# Patient Record
Sex: FEMALE | Race: WHITE | Employment: UNEMPLOYED | URBAN - METROPOLITAN AREA
[De-identification: names, ages, dates, MRNs, and addresses within clinical notes are randomized per-mention and may not be internally consistent; named-entity substitution may affect disease eponyms.]

---

## 2023-12-01 PROCEDURE — 99284 EMERGENCY DEPT VISIT MOD MDM: CPT

## 2023-12-01 RX ORDER — VALACYCLOVIR HYDROCHLORIDE 1 G/1
1000 TABLET, FILM COATED ORAL EVERY 12 HOURS SCHEDULED
COMMUNITY

## 2023-12-01 RX ORDER — FEXOFENADINE HCL 60 MG/1
60 TABLET, FILM COATED ORAL DAILY
COMMUNITY

## 2023-12-01 RX ORDER — OMEPRAZOLE 20 MG/1
20 CAPSULE, DELAYED RELEASE ORAL
COMMUNITY

## 2023-12-02 ENCOUNTER — HOSPITAL ENCOUNTER (EMERGENCY)
Age: 53
Discharge: HOME | End: 2023-12-02
Attending: EMERGENCY MEDICINE
Payer: COMMERCIAL

## 2023-12-02 VITALS
DIASTOLIC BLOOD PRESSURE: 64 MMHG | TEMPERATURE: 98 F | SYSTOLIC BLOOD PRESSURE: 100 MMHG | RESPIRATION RATE: 16 BRPM | OXYGEN SATURATION: 96 % | HEART RATE: 67 BPM | BODY MASS INDEX: 25.21 KG/M2 | HEIGHT: 62 IN | WEIGHT: 137 LBS

## 2023-12-02 DIAGNOSIS — R10.9 ABDOMINAL PAIN OF UNKNOWN ETIOLOGY: ICD-10-CM

## 2023-12-02 DIAGNOSIS — R11.2 NAUSEA AND VOMITING, UNSPECIFIED VOMITING TYPE: Primary | ICD-10-CM

## 2023-12-02 LAB
ALBUMIN SERPL-MCNC: 3.7 G/DL (ref 3.4–5)
ALBUMIN/GLOB SERPL: 0.9 {RATIO} (ref 1–2)
ALP LIVER SERPL-CCNC: 98 U/L
ALT SERPL-CCNC: 43 U/L
ANION GAP SERPL CALC-SCNC: 2 MMOL/L (ref 0–18)
AST SERPL-CCNC: 32 U/L (ref 15–37)
BASOPHILS # BLD AUTO: 0.03 X10(3) UL (ref 0–0.2)
BASOPHILS NFR BLD AUTO: 0.3 %
BILIRUB SERPL-MCNC: 0.2 MG/DL (ref 0.1–2)
BILIRUB UR QL STRIP.AUTO: NEGATIVE
BUN BLD-MCNC: 21 MG/DL (ref 9–23)
CALCIUM BLD-MCNC: 9.1 MG/DL (ref 8.5–10.1)
CHLORIDE SERPL-SCNC: 104 MMOL/L (ref 98–112)
CLARITY UR REFRACT.AUTO: CLEAR
CO2 SERPL-SCNC: 32 MMOL/L (ref 21–32)
COLOR UR AUTO: YELLOW
CREAT BLD-MCNC: 0.88 MG/DL
EGFRCR SERPLBLD CKD-EPI 2021: 79 ML/MIN/1.73M2 (ref 60–?)
EOSINOPHIL # BLD AUTO: 0.39 X10(3) UL (ref 0–0.7)
EOSINOPHIL NFR BLD AUTO: 3.6 %
ERYTHROCYTE [DISTWIDTH] IN BLOOD BY AUTOMATED COUNT: 12.7 %
GLOBULIN PLAS-MCNC: 4.3 G/DL (ref 2.8–4.4)
GLUCOSE BLD-MCNC: 115 MG/DL (ref 70–99)
GLUCOSE UR STRIP.AUTO-MCNC: NEGATIVE MG/DL
HCT VFR BLD AUTO: 39.1 %
HGB BLD-MCNC: 13.2 G/DL
HYALINE CASTS #/AREA URNS AUTO: PRESENT /LPF
HYALINE CASTS #/AREA URNS AUTO: PRESENT /LPF
IMM GRANULOCYTES # BLD AUTO: 0.03 X10(3) UL (ref 0–1)
IMM GRANULOCYTES NFR BLD: 0.3 %
KETONES UR STRIP.AUTO-MCNC: NEGATIVE MG/DL
LIPASE SERPL-CCNC: 50 U/L (ref 13–75)
LYMPHOCYTES # BLD AUTO: 1.71 X10(3) UL (ref 1–4)
LYMPHOCYTES NFR BLD AUTO: 15.6 %
MCH RBC QN AUTO: 31.5 PG (ref 26–34)
MCHC RBC AUTO-ENTMCNC: 33.8 G/DL (ref 31–37)
MCV RBC AUTO: 93.3 FL
MONOCYTES # BLD AUTO: 0.63 X10(3) UL (ref 0.1–1)
MONOCYTES NFR BLD AUTO: 5.8 %
NEUTROPHILS # BLD AUTO: 8.15 X10 (3) UL (ref 1.5–7.7)
NEUTROPHILS # BLD AUTO: 8.15 X10(3) UL (ref 1.5–7.7)
NEUTROPHILS NFR BLD AUTO: 74.4 %
NITRITE UR QL STRIP.AUTO: NEGATIVE
OSMOLALITY SERPL CALC.SUM OF ELEC: 290 MOSM/KG (ref 275–295)
PH UR STRIP.AUTO: 7 [PH] (ref 5–8)
PLATELET # BLD AUTO: 339 10(3)UL (ref 150–450)
POTASSIUM SERPL-SCNC: 3.4 MMOL/L (ref 3.5–5.1)
PROT SERPL-MCNC: 8 G/DL (ref 6.4–8.2)
PROT UR STRIP.AUTO-MCNC: NEGATIVE MG/DL
RBC # BLD AUTO: 4.19 X10(6)UL
RBC UR QL AUTO: NEGATIVE
SODIUM SERPL-SCNC: 138 MMOL/L (ref 136–145)
SP GR UR STRIP.AUTO: 1.02 (ref 1–1.03)
UROBILINOGEN UR STRIP.AUTO-MCNC: 0.2 MG/DL
WBC # BLD AUTO: 10.9 X10(3) UL (ref 4–11)

## 2023-12-02 PROCEDURE — 81001 URINALYSIS AUTO W/SCOPE: CPT | Performed by: EMERGENCY MEDICINE

## 2023-12-02 PROCEDURE — 80053 COMPREHEN METABOLIC PANEL: CPT | Performed by: EMERGENCY MEDICINE

## 2023-12-02 PROCEDURE — 81015 MICROSCOPIC EXAM OF URINE: CPT

## 2023-12-02 PROCEDURE — 85025 COMPLETE CBC W/AUTO DIFF WBC: CPT | Performed by: EMERGENCY MEDICINE

## 2023-12-02 PROCEDURE — 96375 TX/PRO/DX INJ NEW DRUG ADDON: CPT

## 2023-12-02 PROCEDURE — 96361 HYDRATE IV INFUSION ADD-ON: CPT

## 2023-12-02 PROCEDURE — 96374 THER/PROPH/DIAG INJ IV PUSH: CPT

## 2023-12-02 PROCEDURE — 83690 ASSAY OF LIPASE: CPT | Performed by: EMERGENCY MEDICINE

## 2023-12-02 PROCEDURE — 80053 COMPREHEN METABOLIC PANEL: CPT

## 2023-12-02 PROCEDURE — 83690 ASSAY OF LIPASE: CPT

## 2023-12-02 PROCEDURE — 85025 COMPLETE CBC W/AUTO DIFF WBC: CPT

## 2023-12-02 PROCEDURE — 81001 URINALYSIS AUTO W/SCOPE: CPT

## 2023-12-02 RX ORDER — ONDANSETRON 4 MG/1
4 TABLET, ORALLY DISINTEGRATING ORAL EVERY 8 HOURS PRN
Qty: 10 TABLET | Refills: 0 | Status: SHIPPED | OUTPATIENT
Start: 2023-12-02 | End: 2023-12-09

## 2023-12-02 RX ORDER — KETOROLAC TROMETHAMINE 15 MG/ML
15 INJECTION, SOLUTION INTRAMUSCULAR; INTRAVENOUS ONCE
Status: COMPLETED | OUTPATIENT
Start: 2023-12-02 | End: 2023-12-02

## 2023-12-02 RX ORDER — ONDANSETRON 2 MG/ML
4 INJECTION INTRAMUSCULAR; INTRAVENOUS ONCE
Status: COMPLETED | OUTPATIENT
Start: 2023-12-02 | End: 2023-12-02

## 2023-12-02 RX ORDER — POTASSIUM CHLORIDE 20 MEQ/1
20 TABLET, EXTENDED RELEASE ORAL ONCE
Status: COMPLETED | OUTPATIENT
Start: 2023-12-02 | End: 2023-12-02

## 2023-12-02 NOTE — ED INITIAL ASSESSMENT (HPI)
Patient here with abdominal pain. States there have been episodes over the past week. Also notes N/V/D.

## 2024-04-03 ENCOUNTER — APPOINTMENT (OUTPATIENT)
Dept: GENERAL RADIOLOGY | Age: 54
End: 2024-04-03
Attending: EMERGENCY MEDICINE
Payer: COMMERCIAL

## 2024-04-03 ENCOUNTER — APPOINTMENT (OUTPATIENT)
Dept: CT IMAGING | Age: 54
End: 2024-04-03
Attending: EMERGENCY MEDICINE
Payer: COMMERCIAL

## 2024-04-03 ENCOUNTER — HOSPITAL ENCOUNTER (EMERGENCY)
Age: 54
Discharge: HOME | End: 2024-04-03
Attending: EMERGENCY MEDICINE
Payer: COMMERCIAL

## 2024-04-03 VITALS
TEMPERATURE: 99 F | BODY MASS INDEX: 25.76 KG/M2 | RESPIRATION RATE: 16 BRPM | DIASTOLIC BLOOD PRESSURE: 65 MMHG | WEIGHT: 140 LBS | HEART RATE: 72 BPM | SYSTOLIC BLOOD PRESSURE: 133 MMHG | HEIGHT: 62 IN | OXYGEN SATURATION: 97 %

## 2024-04-03 DIAGNOSIS — K21.9 GASTROESOPHAGEAL REFLUX DISEASE, UNSPECIFIED WHETHER ESOPHAGITIS PRESENT: ICD-10-CM

## 2024-04-03 DIAGNOSIS — R10.9 ABDOMINAL PAIN, ACUTE: Primary | ICD-10-CM

## 2024-04-03 LAB
ALBUMIN SERPL-MCNC: 3.9 G/DL (ref 3.4–5)
ALBUMIN/GLOB SERPL: 1.1 {RATIO} (ref 1–2)
ALP LIVER SERPL-CCNC: 76 U/L
ALT SERPL-CCNC: 72 U/L
ANION GAP SERPL CALC-SCNC: 6 MMOL/L (ref 0–18)
AST SERPL-CCNC: 67 U/L (ref 15–37)
B-HCG UR QL: NEGATIVE
BASOPHILS # BLD AUTO: 0.01 X10(3) UL (ref 0–0.2)
BASOPHILS NFR BLD AUTO: 0.2 %
BILIRUB SERPL-MCNC: 0.4 MG/DL (ref 0.1–2)
BILIRUB UR QL STRIP.AUTO: NEGATIVE
BUN BLD-MCNC: 22 MG/DL (ref 9–23)
CALCIUM BLD-MCNC: 9.4 MG/DL (ref 8.5–10.1)
CHLORIDE SERPL-SCNC: 104 MMOL/L (ref 98–112)
CLARITY UR REFRACT.AUTO: CLEAR
CO2 SERPL-SCNC: 27 MMOL/L (ref 21–32)
COLOR UR AUTO: YELLOW
CREAT BLD-MCNC: 0.73 MG/DL
D DIMER PPP FEU-MCNC: 0.48 UG/ML FEU (ref ?–0.53)
EGFRCR SERPLBLD CKD-EPI 2021: 98 ML/MIN/1.73M2 (ref 60–?)
EOSINOPHIL # BLD AUTO: 0.1 X10(3) UL (ref 0–0.7)
EOSINOPHIL NFR BLD AUTO: 1.6 %
ERYTHROCYTE [DISTWIDTH] IN BLOOD BY AUTOMATED COUNT: 12.7 %
GLOBULIN PLAS-MCNC: 3.7 G/DL (ref 2.8–4.4)
GLUCOSE BLD-MCNC: 103 MG/DL (ref 70–99)
GLUCOSE UR STRIP.AUTO-MCNC: NEGATIVE MG/DL
HCT VFR BLD AUTO: 37.9 %
HGB BLD-MCNC: 13.2 G/DL
IMM GRANULOCYTES # BLD AUTO: 0.01 X10(3) UL (ref 0–1)
IMM GRANULOCYTES NFR BLD: 0.2 %
KETONES UR STRIP.AUTO-MCNC: 15 MG/DL
LEUKOCYTE ESTERASE UR QL STRIP.AUTO: NEGATIVE
LIPASE SERPL-CCNC: 43 U/L (ref 13–75)
LYMPHOCYTES # BLD AUTO: 1.69 X10(3) UL (ref 1–4)
LYMPHOCYTES NFR BLD AUTO: 27.7 %
MCH RBC QN AUTO: 32.2 PG (ref 26–34)
MCHC RBC AUTO-ENTMCNC: 34.8 G/DL (ref 31–37)
MCV RBC AUTO: 92.4 FL
MONOCYTES # BLD AUTO: 0.38 X10(3) UL (ref 0.1–1)
MONOCYTES NFR BLD AUTO: 6.2 %
NEUTROPHILS # BLD AUTO: 3.92 X10 (3) UL (ref 1.5–7.7)
NEUTROPHILS # BLD AUTO: 3.92 X10(3) UL (ref 1.5–7.7)
NEUTROPHILS NFR BLD AUTO: 64.1 %
NITRITE UR QL STRIP.AUTO: NEGATIVE
OSMOLALITY SERPL CALC.SUM OF ELEC: 288 MOSM/KG (ref 275–295)
PH UR STRIP.AUTO: 7 [PH] (ref 5–8)
PLATELET # BLD AUTO: 282 10(3)UL (ref 150–450)
POTASSIUM SERPL-SCNC: 3.5 MMOL/L (ref 3.5–5.1)
PROT SERPL-MCNC: 7.6 G/DL (ref 6.4–8.2)
PROT UR STRIP.AUTO-MCNC: NEGATIVE MG/DL
RBC # BLD AUTO: 4.1 X10(6)UL
RBC UR QL AUTO: NEGATIVE
SODIUM SERPL-SCNC: 137 MMOL/L (ref 136–145)
SP GR UR STRIP.AUTO: 1.02 (ref 1–1.03)
TROPONIN I SERPL HS-MCNC: 3 NG/L
UROBILINOGEN UR STRIP.AUTO-MCNC: 0.2 MG/DL
WBC # BLD AUTO: 6.1 X10(3) UL (ref 4–11)

## 2024-04-03 PROCEDURE — 85379 FIBRIN DEGRADATION QUANT: CPT | Performed by: EMERGENCY MEDICINE

## 2024-04-03 PROCEDURE — 99285 EMERGENCY DEPT VISIT HI MDM: CPT

## 2024-04-03 PROCEDURE — 80053 COMPREHEN METABOLIC PANEL: CPT | Performed by: EMERGENCY MEDICINE

## 2024-04-03 PROCEDURE — 93005 ELECTROCARDIOGRAM TRACING: CPT

## 2024-04-03 PROCEDURE — 84484 ASSAY OF TROPONIN QUANT: CPT | Performed by: EMERGENCY MEDICINE

## 2024-04-03 PROCEDURE — 74177 CT ABD & PELVIS W/CONTRAST: CPT | Performed by: EMERGENCY MEDICINE

## 2024-04-03 PROCEDURE — 85025 COMPLETE CBC W/AUTO DIFF WBC: CPT | Performed by: EMERGENCY MEDICINE

## 2024-04-03 PROCEDURE — 83690 ASSAY OF LIPASE: CPT | Performed by: EMERGENCY MEDICINE

## 2024-04-03 PROCEDURE — 96375 TX/PRO/DX INJ NEW DRUG ADDON: CPT

## 2024-04-03 PROCEDURE — 96361 HYDRATE IV INFUSION ADD-ON: CPT

## 2024-04-03 PROCEDURE — 81003 URINALYSIS AUTO W/O SCOPE: CPT | Performed by: EMERGENCY MEDICINE

## 2024-04-03 PROCEDURE — 81025 URINE PREGNANCY TEST: CPT

## 2024-04-03 PROCEDURE — 93010 ELECTROCARDIOGRAM REPORT: CPT

## 2024-04-03 PROCEDURE — 71045 X-RAY EXAM CHEST 1 VIEW: CPT | Performed by: EMERGENCY MEDICINE

## 2024-04-03 PROCEDURE — 96374 THER/PROPH/DIAG INJ IV PUSH: CPT

## 2024-04-03 RX ORDER — KETOROLAC TROMETHAMINE 30 MG/ML
30 INJECTION, SOLUTION INTRAMUSCULAR; INTRAVENOUS ONCE
Status: COMPLETED | OUTPATIENT
Start: 2024-04-03 | End: 2024-04-03

## 2024-04-03 RX ORDER — ONDANSETRON 2 MG/ML
4 INJECTION INTRAMUSCULAR; INTRAVENOUS ONCE
Status: COMPLETED | OUTPATIENT
Start: 2024-04-03 | End: 2024-04-03

## 2024-04-03 RX ORDER — DICYCLOMINE HCL 20 MG
20 TABLET ORAL 4 TIMES DAILY PRN
Qty: 15 TABLET | Refills: 0 | Status: SHIPPED | OUTPATIENT
Start: 2024-04-03

## 2024-04-03 RX ORDER — SUCRALFATE 1 G/1
1 TABLET ORAL
Qty: 30 TABLET | Refills: 0 | Status: SHIPPED | OUTPATIENT
Start: 2024-04-03

## 2024-04-03 RX ORDER — SODIUM CHLORIDE 9 MG/ML
INJECTION, SOLUTION INTRAVENOUS ONCE
Status: COMPLETED | OUTPATIENT
Start: 2024-04-03 | End: 2024-04-03

## 2024-04-03 RX ORDER — PANTOPRAZOLE SODIUM 40 MG/1
40 TABLET, DELAYED RELEASE ORAL DAILY
Qty: 30 TABLET | Refills: 0 | Status: SHIPPED | OUTPATIENT
Start: 2024-04-03 | End: 2024-05-03

## 2024-04-03 NOTE — ED INITIAL ASSESSMENT (HPI)
Pt reports sudden onset mid abd pain radiating to chest and mid upper back around 1400 today while laying in bed. Pt states she felt SOB and nausea/heaving and one episode of diarrhea. Pt states pain has improved upon arrival.

## 2024-04-03 NOTE — ED PROVIDER NOTES
Patient Seen in: [facility] In [place]      History     Chief Complaint   Patient presents with    Abdomen/Flank Pain    Chest Pain     Stated Complaint: lower abdominal cramping, bloating and nausea    Subjective:   HPI    Patient is a 53-year-old female presents emergency room with history of sudden onset of mid abdominal pain that radiate up towards her epigastric area in her mid back which started around 2:00 this afternoon when she was laying in bed.  The patient that she felt some nausea associated with symptoms.  The patient had 1 episode of diarrhea.  The patient states that her pain is since improved.  Patient states she had some similar pain last December was seen here in the emergency room had a negative workup done at that time.  The patient had a previous cholecystectomy no other abdominal surgeries.  The patient denies history of any heart or lung problems in the past.  The patient denies history of any other somatic complaints or discomfort at this time.    Objective:   No pertinent past medical history.            No pertinent past surgical history.              No pertinent social history.            Review of Systems    Positive for stated complaint: lower abdominal cramping, bloating and nausea  Other systems are as noted in HPI.  Constitutional and vital signs reviewed.      All other systems reviewed and negative except as noted above.    Physical Exam     ED Triage Vitals [04/03/24 1732]   /85   Pulse 69   Resp 16   Temp 99.1 °F (37.3 °C)   Temp src Temporal   SpO2 96 %   O2 Device None (Room air)       Current:/65   Pulse 72   Temp 99.1 °F (37.3 °C) (Temporal)   Resp 16   Ht 157.5 cm (5' 2\")   Wt 63.5 kg   SpO2 97%   BMI 25.61 kg/m²         Physical Exam  GENERAL: Well-developed, well-nourished female sitting up breathing easily in no apparent distress.  Patient is nontoxic in appearance.  HEENT: Head is normocephalic, atraumatic. Pupils are 4 mm  equally round and reactive to light. Oropharynx is clear. Mucous membranes are moist.  NECK: There is no focal tenderness to palpation appreciated.   LUNGS: Clear to auscultation bilaterally with no wheeze. There is good equal air entry bilaterally.  HEART: Regular rate and rhythm. Normal S1, S2 no S3, or S4. No murmur.  ABDOMEN: There is mild focal tenderness to palpation appreciated to the epigastric and mid abdomen only with no other focal tenderness to palpation appreciated. There is no guarding, no rebound, no mass, and no organomegaly appreciated. There is normoactive bowel sounds.   EXTREMITIES: There is no cyanosis, clubbing, or edema appreciated. Pulses are 2+ and equal in all 4 extremities.  NEURO: Patient is awake, alert and oriented to time place and person. Motor strength is 5 over 5 in all 4 extremities. There are no gross motor or sensory deficits appreciated.  Patient answering all questions appropriately.           ED Course     Labs Reviewed   COMP METABOLIC PANEL (14) - Abnormal; Notable for the following components:       Result Value    Glucose 103 (*)     AST 67 (*)     ALT 72 (*)     All other components within normal limits   URINALYSIS WITH CULTURE REFLEX - Abnormal; Notable for the following components:    Ketones Urine 15 (*)     All other components within normal limits   TROPONIN I HIGH SENSITIVITY - Normal   LIPASE - Normal   D-DIMER - Normal   POCT PREGNANCY URINE - Normal   CBC WITH DIFFERENTIAL WITH PLATELET    Narrative:     The following orders were created for panel order CBC With Differential With Platelet.  Procedure                               Abnormality         Status                     ---------                               -----------         ------                     CBC W/ DIFFERENTIAL[[id number]]                              Final result                 Please view results for these tests on the individual orders.   RAINBOW DRAW LAVENDER   RAINBOW DRAW LIGHT GREEN    RAINBOW DRAW BLUE   CBC W/ DIFFERENTIAL     EKG    Rate, intervals and axes as noted on EKG Report.  Rate: 63  Rhythm: Sinus Rhythm  Reading: No acute ischemic change noted         I personally reviewed the patient's chest x-ray images and my individual interpretation of these images shows no evidence of any acute pneumothorax or free air.  I also reviewed the official radiology report which showed results as noted below.        CT ABDOMEN+PELVIS(CONTRAST ONLY)(CPT=74177)    Result Date: 4/3/2024  CONCLUSION:  1. No acute abnormality in the abdomen and pelvis.  Incidental left adnexal cyst is likely physiologic 2. Myomatous uterus.   LOCATION:  [id number]   Dictated by (CST): eMlvin Jack MD on 4/03/2024 at 8:26 PM     Finalized by (CST): Melvin Jack MD on 4/03/2024 at 8:29 PM       XR CHEST AP PORTABLE  (CPT=71045)    Result Date: 4/3/2024  CONCLUSION: No acute cardiopulmonary abnormality.   LOCATION:  [id number]      Dictated by (CST): Melvin Jack MD on 4/03/2024 at 6:40 PM     Finalized by (CST): Melvin Jack MD on 4/03/2024 at 6:41 PM               MDM          19:49 patient sitting back and breathing easily in no apparent distress this time.  Patient feeling better on repeat examination at this time.  Patient does states she has some mild lower abdominal cramping only at this time.  Patient does not want any additional medication for pain at this time.  Awaiting CT scan at this time.  21:00 patient sitting back and breathing easily in no apparent distress this time.  The patient be discharged home at this time.  Patient instructed to follow-up with primary care and encourage a bland diet at home.  Patient discharged home with no further complaints      Patient had an IV line established blood work drawn including a CBC, chemistries, BUN and creatinine, and blood sugar all of which are unremarkable.  She has mild elevation of her liver transaminases which the patient is made aware of and the need for follow-up  with her primary care doctor for these.  Patient's bilirubin, alkaline phosphatase, and lipase are all negative.  Patient troponin is negative.  Patient D-dimer is negative.  Patient was given IV fluids and IV pain medication the emergency room felt better after this was given.  The patient had no further new complaints throughout the rest of the emergency room stay.  The patient will be discharged home at this time patient was feeling better on repeat examination.  She knows to follow-up with her primary care doctor regarding her symptoms.  Patient knows to return to the ER immediately if symptoms worsen or if any other problems arise.  Patient discharged home with improvement in symptoms and no further complaints.                             Medical Decision Making      Disposition and Plan     Clinical Impression:  1. Abdominal pain, acute    2. Gastroesophageal reflux disease, unspecified whether esophagitis present         Disposition:  Discharge  4/3/2024  9:00 pm    Follow-up:  Garrison Charles MD  [id number]  [facility] [address]  [phone]    Follow up in 2 day(s)            Medications Prescribed:  Discharge Medication List as of 4/3/2024  9:05 PM        START taking these medications    Details   dicyclomine 20 MG Oral Tab Take 1 tablet (20 mg total) by mouth 4 (four) times daily as needed., Normal, Disp-15 tablet, R-0      pantoprazole 40 MG Oral Tab EC Take 1 tablet (40 mg total) by mouth daily., Normal, Disp-30 tablet, R-0      sucralfate 1 g Oral Tab Take 1 tablet (1 g total) by mouth 4 (four) times daily before meals and nightly., Normal, Disp-30 tablet, R-0

## 2024-04-04 LAB
ATRIAL RATE: 63 BPM
P AXIS: 34 DEGREES
P-R INTERVAL: 156 MS
Q-T INTERVAL: 394 MS
QRS DURATION: 82 MS
QTC CALCULATION (BEZET): 403 MS
R AXIS: 29 DEGREES
T AXIS: 54 DEGREES
VENTRICULAR RATE: 63 BPM

## 2024-04-04 NOTE — DISCHARGE INSTRUCTIONS
ENCOURAGE A BLAND DIET AT HOME  NO CAFFEINE, CHOCOLATE, SPICY FOODS, OR ALCOHOL AT HOME  RETURN TO THE ED IF SYMPTOMS WORSEN OR IF ANY OTHER PROBLEMS ARISE

## 2024-07-26 ENCOUNTER — APPOINTMENT (OUTPATIENT)
Dept: CT IMAGING | Age: 54
End: 2024-07-26
Attending: EMERGENCY MEDICINE
Payer: COMMERCIAL

## 2024-07-26 ENCOUNTER — HOSPITAL ENCOUNTER (EMERGENCY)
Age: 54
Discharge: HOME | End: 2024-07-26
Attending: EMERGENCY MEDICINE
Payer: COMMERCIAL

## 2024-07-26 VITALS
SYSTOLIC BLOOD PRESSURE: 130 MMHG | WEIGHT: 140 LBS | BODY MASS INDEX: 26 KG/M2 | DIASTOLIC BLOOD PRESSURE: 68 MMHG | TEMPERATURE: 98 F | RESPIRATION RATE: 16 BRPM | OXYGEN SATURATION: 99 % | HEART RATE: 60 BPM

## 2024-07-26 DIAGNOSIS — N23 RENAL COLIC: Primary | ICD-10-CM

## 2024-07-26 LAB
BILIRUB UR QL STRIP.AUTO: NEGATIVE
COLOR UR AUTO: YELLOW
GLUCOSE UR STRIP.AUTO-MCNC: NEGATIVE MG/DL
KETONES UR STRIP.AUTO-MCNC: NEGATIVE MG/DL
NITRITE UR QL STRIP.AUTO: NEGATIVE
PH UR STRIP.AUTO: 5.5 [PH] (ref 5–8)
RBC #/AREA URNS AUTO: >10 /HPF
SP GR UR STRIP.AUTO: >=1.03 (ref 1–1.03)
UROBILINOGEN UR STRIP.AUTO-MCNC: 0.2 MG/DL

## 2024-07-26 PROCEDURE — 96361 HYDRATE IV INFUSION ADD-ON: CPT

## 2024-07-26 PROCEDURE — 87186 SC STD MICRODIL/AGAR DIL: CPT | Performed by: EMERGENCY MEDICINE

## 2024-07-26 PROCEDURE — 96375 TX/PRO/DX INJ NEW DRUG ADDON: CPT

## 2024-07-26 PROCEDURE — 81001 URINALYSIS AUTO W/SCOPE: CPT | Performed by: EMERGENCY MEDICINE

## 2024-07-26 PROCEDURE — 96374 THER/PROPH/DIAG INJ IV PUSH: CPT

## 2024-07-26 PROCEDURE — 87086 URINE CULTURE/COLONY COUNT: CPT | Performed by: EMERGENCY MEDICINE

## 2024-07-26 PROCEDURE — 99284 EMERGENCY DEPT VISIT MOD MDM: CPT

## 2024-07-26 PROCEDURE — 74176 CT ABD & PELVIS W/O CONTRAST: CPT | Performed by: EMERGENCY MEDICINE

## 2024-07-26 PROCEDURE — 81015 MICROSCOPIC EXAM OF URINE: CPT | Performed by: EMERGENCY MEDICINE

## 2024-07-26 PROCEDURE — 87088 URINE BACTERIA CULTURE: CPT | Performed by: EMERGENCY MEDICINE

## 2024-07-26 RX ORDER — ONDANSETRON 2 MG/ML
4 INJECTION INTRAMUSCULAR; INTRAVENOUS ONCE
Status: COMPLETED | OUTPATIENT
Start: 2024-07-26 | End: 2024-07-26

## 2024-07-26 RX ORDER — KETOROLAC TROMETHAMINE 30 MG/ML
30 INJECTION, SOLUTION INTRAMUSCULAR; INTRAVENOUS ONCE
Status: COMPLETED | OUTPATIENT
Start: 2024-07-26 | End: 2024-07-26

## 2024-07-26 RX ORDER — ONDANSETRON 4 MG/1
4 TABLET, ORALLY DISINTEGRATING ORAL EVERY 4 HOURS PRN
Qty: 10 TABLET | Refills: 0 | Status: SHIPPED | OUTPATIENT
Start: 2024-07-26 | End: 2024-07-31

## 2024-07-26 NOTE — ED PROVIDER NOTES
Patient Seen in: [facility] In [place]      History     Chief Complaint   Patient presents with    Abdomen/Flank Pain     Stated Complaint: right flank pain    Subjective:   HPI    54-year-old female with a previous history of kidney stones, presents to the emergency department with complaints of acute onset right flank pain and nausea.  Patient states that she was asleep when she had sudden onset pain which woke her up.  She states it feels similar to her prior kidney stones.  Denies fevers or chills.  She had complaints of nausea without vomiting.  Denies any dysuria hematuria or frequency.  She states that in the past she has had lithotripsy.    Objective:   No pertinent past medical history.            No pertinent past surgical history.              No pertinent social history.            Review of Systems    Positive for stated Chief Complaint: Abdomen/Flank Pain    Other systems are as noted in HPI.  Constitutional and vital signs reviewed.      All other systems reviewed and negative except as noted above.    Physical Exam     ED Triage Vitals   BP    Pulse    Resp    Temp    Temp src    SpO2    O2 Device        Current Vitals:   No data recorded        Physical Exam    General: Alert and oriented. No acute distress.  HEENT: Normocephalic. No evidence of trauma. Extraocular movements are intact.  Cardiovascular exam: Regular rate and rhythm  Lungs: Clear to auscultation bilaterally.  Abdomen: Soft, nondistended, nontender.  Extremities: No evidence of deformity. No clubbing or cyanosis.  Neuro: No focal deficit is noted.    ED Course     Labs Reviewed   URINALYSIS WITH CULTURE REFLEX   Patient was brought back to the examination room immediately.  The patient was then placed on a cardiac monitor and pulse oximetry was applied.  Patient had an IV established and labs were drawn.    The patient was administered Toradol 30 mg IV, Zofran 4 mg IV medications for the purposes of treating   [acute renal colic].  The patient had good response to these medications.    Patient continued to be observed here in the emergency department.  Patient remained stable throughout the emergency department observation period.    Findings  of the patient's tests results were discussed with the patient in detail.  They were understanding of these results and their discharge instructions.  All questions were answered.         MDM        History is provided by the patient  Past medical history includes a history of kidney stones, gastroesophageal reflux  Surgical history includes a cholecystectomy and breast surgery  Social history negative for smoking or alcohol abuse  Family history is noncontributory    Differential diagnosis includes acute renal colic versus pyelonephritis versus musculoskeletal back pain    Patient had a peripheral IV established.  Patient was administered IV Toradol and Zofran.  A CT scan of the abdomen pelvis has been ordered.        Urinalysis shows large blood.  Trace leukocyte Estrace.    Patient was reassessed at 5:50 AM.  She states that her pain was resolved after Toradol.  We discussed potential pain medications that she can go home with that she states that she is allergic to fentanyl and codeine both of which are causing her nausea vomiting.  She declines any pain medications at this time.    Patient was screened and evaluated during this visit.   As a treating physician attending to the patient, I determined, within reasonable clinical confidence and prior to discharge, that an emergency medical condition was not or was no longer present.  There was no indication for further evaluation, treatment or admission on an emergency basis.  Comprehensive verbal and written discharge and follow-up instructions were provided to help prevent relapse or worsening.  Patient was instructed to follow-up with her primary care provider for further evaluation and treatment, but to return immediately to  the ER for worsening, concerning, new, changing or persisting symptoms.  I discussed the case with the patient and they had no questions, complaints, or concerns.  Patient felt comfortable going home.    ^^Please note that this report has been produced using speech recognition software and may contain errors related to that system including, but not limited to, errors in grammar, punctuation, and spelling, as well as words and phrases that possibly may have been recognized inappropriately.  If there are any questions or concerns, contact the dictating provider for clarification                                 Medical Decision Making      Disposition and Plan     Clinical Impression:  No diagnosis found.     Disposition:  There is no disposition on file for this visit.  There is no disposition time on file for this visit.    Follow-up:  No follow-up provider specified.        Medications Prescribed:  Current Discharge Medication List                                          inappropriately.  If there are any questions or concerns, contact the dictating provider for clarification                                 Medical Decision Making      Disposition and Plan     Clinical Impression:  1. Renal colic         Disposition:  Discharge  7/26/2024  6:18 am    Follow-up:  Grarison Charles MD  [id number]  [facility] [address]  [phone]    Follow up      [facility]   [address] [facility] [id number] [phone]  Follow up            Medications Prescribed:  Discharge Medication List as of 7/26/2024  6:19 AM        START taking these medications    Details   ondansetron 4 MG Oral Tablet Dispersible Take 1 tablet (4 mg total) by mouth every 4 (four) hours as needed for Nausea., Normal, Disp-10 tablet, R-0

## 2024-07-26 NOTE — DISCHARGE INSTRUCTIONS
Follow-up with urology.  Take ibuprofen as needed for pain every 6 hours  Take Zofran as needed for nausea every 4 hours  Strain all urine until stone is passed  Return to the emergency department for any worsening symptoms or new concern

## 2024-07-28 ENCOUNTER — ANESTHESIA EVENT (OUTPATIENT)
Dept: SURGERY | Facility: HOSPITAL | Age: 54
End: 2024-07-28
Payer: COMMERCIAL

## 2024-07-28 ENCOUNTER — HOSPITAL ENCOUNTER (INPATIENT)
Facility: HOSPITAL | Age: 54
LOS: 3 days | Discharge: HOME | End: 2024-07-31
Attending: EMERGENCY MEDICINE | Admitting: HOSPITALIST
Payer: COMMERCIAL

## 2024-07-28 ENCOUNTER — APPOINTMENT (OUTPATIENT)
Dept: GENERAL RADIOLOGY | Facility: HOSPITAL | Age: 54
End: 2024-07-28
Attending: UROLOGY
Payer: COMMERCIAL

## 2024-07-28 ENCOUNTER — HOSPITAL ENCOUNTER (EMERGENCY)
Age: 54
Discharge: STILL ADMITTED OR ADMINISTRATIVE | End: 2024-07-28
Payer: COMMERCIAL

## 2024-07-28 ENCOUNTER — ANESTHESIA (OUTPATIENT)
Dept: SURGERY | Facility: HOSPITAL | Age: 54
End: 2024-07-28
Payer: COMMERCIAL

## 2024-07-28 DIAGNOSIS — N10 ACUTE PYELONEPHRITIS: ICD-10-CM

## 2024-07-28 DIAGNOSIS — N20.1 URETEROLITHIASIS: Primary | ICD-10-CM

## 2024-07-28 LAB
ALBUMIN SERPL-MCNC: 4.7 G/DL (ref 3.2–4.8)
ALBUMIN/GLOB SERPL: 1.7 {RATIO} (ref 1–2)
ALP LIVER SERPL-CCNC: 116 U/L
ALT SERPL-CCNC: 117 U/L
ANION GAP SERPL CALC-SCNC: 6 MMOL/L (ref 0–18)
APTT PPP: 26.5 SECONDS (ref 23–36)
AST SERPL-CCNC: 75 U/L (ref ?–34)
ATRIAL RATE: 103 BPM
BASOPHILS # BLD AUTO: 0.02 X10(3) UL (ref 0–0.2)
BASOPHILS NFR BLD AUTO: 0.2 %
BILIRUB SERPL-MCNC: 0.5 MG/DL (ref 0.3–1.2)
BILIRUB UR QL STRIP.AUTO: NEGATIVE
BUN BLD-MCNC: 15 MG/DL (ref 9–23)
CALCIUM BLD-MCNC: 9.3 MG/DL (ref 8.7–10.4)
CHLORIDE SERPL-SCNC: 101 MMOL/L (ref 98–112)
CO2 SERPL-SCNC: 28 MMOL/L (ref 21–32)
COLOR UR AUTO: YELLOW
CREAT BLD-MCNC: 0.83 MG/DL
EGFRCR SERPLBLD CKD-EPI 2021: 84 ML/MIN/1.73M2 (ref 60–?)
EOSINOPHIL # BLD AUTO: 0 X10(3) UL (ref 0–0.7)
EOSINOPHIL NFR BLD AUTO: 0 %
ERYTHROCYTE [DISTWIDTH] IN BLOOD BY AUTOMATED COUNT: 13.1 %
ERYTHROCYTE [DISTWIDTH] IN BLOOD BY AUTOMATED COUNT: 13.1 %
GLOBULIN PLAS-MCNC: 2.7 G/DL (ref 2–3.5)
GLUCOSE BLD-MCNC: 113 MG/DL (ref 70–99)
GLUCOSE UR STRIP.AUTO-MCNC: NORMAL MG/DL
HCG UR QL: NEGATIVE
HCT VFR BLD AUTO: 35.7 %
HCT VFR BLD AUTO: 35.7 %
HGB BLD-MCNC: 12.2 G/DL
HGB BLD-MCNC: 12.2 G/DL
IMM GRANULOCYTES # BLD AUTO: 0.04 X10(3) UL (ref 0–1)
IMM GRANULOCYTES NFR BLD: 0.3 %
INR BLD: 1.09 (ref 0.8–1.2)
LACTATE SERPL-SCNC: 0.9 MMOL/L (ref 0.5–2)
LACTATE SERPL-SCNC: 0.9 MMOL/L (ref 0.5–2)
LEUKOCYTE ESTERASE UR QL STRIP.AUTO: 500
LYMPHOCYTES # BLD AUTO: 0.41 X10(3) UL (ref 1–4)
LYMPHOCYTES NFR BLD AUTO: 3.5 %
MCH RBC QN AUTO: 32.1 PG (ref 26–34)
MCH RBC QN AUTO: 32.1 PG (ref 26–34)
MCHC RBC AUTO-ENTMCNC: 34.2 G/DL (ref 31–37)
MCHC RBC AUTO-ENTMCNC: 34.2 G/DL (ref 31–37)
MCV RBC AUTO: 93.9 FL
MCV RBC AUTO: 93.9 FL
MONOCYTES # BLD AUTO: 0.84 X10(3) UL (ref 0.1–1)
MONOCYTES NFR BLD AUTO: 7.2 %
NEUTROPHILS # BLD AUTO: 10.39 X10 (3) UL (ref 1.5–7.7)
NEUTROPHILS # BLD AUTO: 10.39 X10(3) UL (ref 1.5–7.7)
NEUTROPHILS NFR BLD AUTO: 88.8 %
OSMOLALITY SERPL CALC.SUM OF ELEC: 282 MOSM/KG (ref 275–295)
P AXIS: 70 DEGREES
P-R INTERVAL: 152 MS
PH UR STRIP.AUTO: 6 [PH] (ref 5–8)
PLATELET # BLD AUTO: 226 10(3)UL (ref 150–450)
PLATELET # BLD AUTO: 226 10(3)UL (ref 150–450)
POTASSIUM SERPL-SCNC: 3.6 MMOL/L (ref 3.5–5.1)
PROT SERPL-MCNC: 7.4 G/DL (ref 5.7–8.2)
PROT UR STRIP.AUTO-MCNC: 100 MG/DL
PROTHROMBIN TIME: 14.1 SECONDS (ref 11.6–14.8)
Q-T INTERVAL: 316 MS
QRS DURATION: 80 MS
QTC CALCULATION (BEZET): 413 MS
R AXIS: 24 DEGREES
RBC # BLD AUTO: 3.8 X10(6)UL
RBC # BLD AUTO: 3.8 X10(6)UL
SODIUM SERPL-SCNC: 135 MMOL/L (ref 136–145)
SP GR UR STRIP.AUTO: 1.02 (ref 1–1.03)
T AXIS: 58 DEGREES
UROBILINOGEN UR STRIP.AUTO-MCNC: NORMAL MG/DL
VENTRICULAR RATE: 103 BPM
WBC # BLD AUTO: 11.7 X10(3) UL (ref 4–11)
WBC # BLD AUTO: 11.7 X10(3) UL (ref 4–11)
WBC #/AREA URNS AUTO: >50 /HPF

## 2024-07-28 PROCEDURE — 0T768DZ DILATION OF RIGHT URETER WITH INTRALUMINAL DEVICE, VIA NATURAL OR ARTIFICIAL OPENING ENDOSCOPIC: ICD-10-PCS | Performed by: UROLOGY

## 2024-07-28 PROCEDURE — BT1D1ZZ FLUOROSCOPY OF RIGHT KIDNEY, URETER AND BLADDER USING LOW OSMOLAR CONTRAST: ICD-10-PCS | Performed by: UROLOGY

## 2024-07-28 PROCEDURE — 99222 1ST HOSP IP/OBS MODERATE 55: CPT | Performed by: HOSPITALIST

## 2024-07-28 DEVICE — URETERAL STENT
Type: IMPLANTABLE DEVICE | Site: URETER | Status: FUNCTIONAL
Brand: ASCERTA™

## 2024-07-28 RX ORDER — SODIUM CHLORIDE 450 MG/100ML
INJECTION, SOLUTION INTRAVENOUS CONTINUOUS
Status: DISCONTINUED | OUTPATIENT
Start: 2024-07-28 | End: 2024-07-31

## 2024-07-28 RX ORDER — ONDANSETRON 2 MG/ML
4 INJECTION INTRAMUSCULAR; INTRAVENOUS EVERY 6 HOURS PRN
Status: DISCONTINUED | OUTPATIENT
Start: 2024-07-28 | End: 2024-07-31

## 2024-07-28 RX ORDER — ONDANSETRON 2 MG/ML
4 INJECTION INTRAMUSCULAR; INTRAVENOUS EVERY 4 HOURS PRN
Status: DISCONTINUED | OUTPATIENT
Start: 2024-07-28 | End: 2024-07-28

## 2024-07-28 RX ORDER — ACETAMINOPHEN 500 MG
500 TABLET ORAL EVERY 4 HOURS PRN
Status: DISCONTINUED | OUTPATIENT
Start: 2024-07-28 | End: 2024-07-31

## 2024-07-28 RX ORDER — SODIUM CHLORIDE 9 MG/ML
INJECTION, SOLUTION INTRAVENOUS CONTINUOUS
Status: ACTIVE | OUTPATIENT
Start: 2024-07-28 | End: 2024-07-28

## 2024-07-28 RX ORDER — SODIUM CHLORIDE, SODIUM LACTATE, POTASSIUM CHLORIDE, CALCIUM CHLORIDE 600; 310; 30; 20 MG/100ML; MG/100ML; MG/100ML; MG/100ML
INJECTION, SOLUTION INTRAVENOUS CONTINUOUS
Status: DISCONTINUED | OUTPATIENT
Start: 2024-07-28 | End: 2024-07-28 | Stop reason: HOSPADM

## 2024-07-28 RX ORDER — MELATONIN
3 NIGHTLY PRN
Status: DISCONTINUED | OUTPATIENT
Start: 2024-07-28 | End: 2024-07-31

## 2024-07-28 RX ORDER — HYDROMORPHONE HYDROCHLORIDE 1 MG/ML
0.4 INJECTION, SOLUTION INTRAMUSCULAR; INTRAVENOUS; SUBCUTANEOUS EVERY 2 HOUR PRN
Status: DISCONTINUED | OUTPATIENT
Start: 2024-07-28 | End: 2024-07-31

## 2024-07-28 RX ORDER — PHENYLEPHRINE HCL 10 MG/ML
VIAL (ML) INJECTION AS NEEDED
Status: DISCONTINUED | OUTPATIENT
Start: 2024-07-28 | End: 2024-07-28 | Stop reason: SURG

## 2024-07-28 RX ORDER — HYDROMORPHONE HYDROCHLORIDE 1 MG/ML
0.5 INJECTION, SOLUTION INTRAMUSCULAR; INTRAVENOUS; SUBCUTANEOUS EVERY 30 MIN PRN
Status: DISCONTINUED | OUTPATIENT
Start: 2024-07-28 | End: 2024-07-28

## 2024-07-28 RX ORDER — HYDROMORPHONE HYDROCHLORIDE 1 MG/ML
0.2 INJECTION, SOLUTION INTRAMUSCULAR; INTRAVENOUS; SUBCUTANEOUS EVERY 5 MIN PRN
Status: DISCONTINUED | OUTPATIENT
Start: 2024-07-28 | End: 2024-07-28 | Stop reason: HOSPADM

## 2024-07-28 RX ORDER — SODIUM CHLORIDE, SODIUM LACTATE, POTASSIUM CHLORIDE, CALCIUM CHLORIDE 600; 310; 30; 20 MG/100ML; MG/100ML; MG/100ML; MG/100ML
INJECTION, SOLUTION INTRAVENOUS CONTINUOUS PRN
Status: DISCONTINUED | OUTPATIENT
Start: 2024-07-28 | End: 2024-07-28 | Stop reason: SURG

## 2024-07-28 RX ORDER — HYDROMORPHONE HYDROCHLORIDE 1 MG/ML
0.8 INJECTION, SOLUTION INTRAMUSCULAR; INTRAVENOUS; SUBCUTANEOUS EVERY 2 HOUR PRN
Status: DISCONTINUED | OUTPATIENT
Start: 2024-07-28 | End: 2024-07-31

## 2024-07-28 RX ORDER — HYDROMORPHONE HYDROCHLORIDE 1 MG/ML
0.4 INJECTION, SOLUTION INTRAMUSCULAR; INTRAVENOUS; SUBCUTANEOUS EVERY 5 MIN PRN
Status: DISCONTINUED | OUTPATIENT
Start: 2024-07-28 | End: 2024-07-28 | Stop reason: HOSPADM

## 2024-07-28 RX ORDER — CETIRIZINE HYDROCHLORIDE 10 MG/1
10 TABLET ORAL DAILY PRN
COMMUNITY

## 2024-07-28 RX ORDER — DIPHENHYDRAMINE HYDROCHLORIDE 50 MG/ML
12.5 INJECTION INTRAMUSCULAR; INTRAVENOUS AS NEEDED
Status: DISCONTINUED | OUTPATIENT
Start: 2024-07-28 | End: 2024-07-28 | Stop reason: HOSPADM

## 2024-07-28 RX ORDER — SENNOSIDES 8.6 MG
17.2 TABLET ORAL NIGHTLY PRN
Status: DISCONTINUED | OUTPATIENT
Start: 2024-07-28 | End: 2024-07-31

## 2024-07-28 RX ORDER — ACETAMINOPHEN 10 MG/ML
INJECTION, SOLUTION INTRAVENOUS AS NEEDED
Status: DISCONTINUED | OUTPATIENT
Start: 2024-07-28 | End: 2024-07-28 | Stop reason: SURG

## 2024-07-28 RX ORDER — ENOXAPARIN SODIUM 100 MG/ML
40 INJECTION SUBCUTANEOUS DAILY
Status: DISCONTINUED | OUTPATIENT
Start: 2024-07-29 | End: 2024-07-31

## 2024-07-28 RX ORDER — BISACODYL 10 MG
10 SUPPOSITORY, RECTAL RECTAL
Status: DISCONTINUED | OUTPATIENT
Start: 2024-07-28 | End: 2024-07-31

## 2024-07-28 RX ORDER — HYDROMORPHONE HYDROCHLORIDE 1 MG/ML
0.6 INJECTION, SOLUTION INTRAMUSCULAR; INTRAVENOUS; SUBCUTANEOUS EVERY 5 MIN PRN
Status: DISCONTINUED | OUTPATIENT
Start: 2024-07-28 | End: 2024-07-28 | Stop reason: HOSPADM

## 2024-07-28 RX ORDER — LIDOCAINE HYDROCHLORIDE 10 MG/ML
INJECTION, SOLUTION EPIDURAL; INFILTRATION; INTRACAUDAL; PERINEURAL AS NEEDED
Status: DISCONTINUED | OUTPATIENT
Start: 2024-07-28 | End: 2024-07-28 | Stop reason: SURG

## 2024-07-28 RX ORDER — PROCHLORPERAZINE EDISYLATE 5 MG/ML
5 INJECTION INTRAMUSCULAR; INTRAVENOUS EVERY 8 HOURS PRN
Status: DISCONTINUED | OUTPATIENT
Start: 2024-07-28 | End: 2024-07-31

## 2024-07-28 RX ORDER — ACETAMINOPHEN 500 MG
1000 TABLET ORAL ONCE
Status: DISCONTINUED | OUTPATIENT
Start: 2024-07-28 | End: 2024-07-31

## 2024-07-28 RX ORDER — ENEMA 19; 7 G/133ML; G/133ML
1 ENEMA RECTAL ONCE AS NEEDED
Status: DISCONTINUED | OUTPATIENT
Start: 2024-07-28 | End: 2024-07-31

## 2024-07-28 RX ORDER — POLYETHYLENE GLYCOL 3350 17 G/17G
17 POWDER, FOR SOLUTION ORAL DAILY PRN
Status: DISCONTINUED | OUTPATIENT
Start: 2024-07-28 | End: 2024-07-31

## 2024-07-28 RX ORDER — OXYBUTYNIN CHLORIDE 5 MG/1
5 TABLET ORAL 4 TIMES DAILY PRN
Status: DISCONTINUED | OUTPATIENT
Start: 2024-07-28 | End: 2024-07-31

## 2024-07-28 RX ORDER — MIDAZOLAM HYDROCHLORIDE 1 MG/ML
1 INJECTION INTRAMUSCULAR; INTRAVENOUS EVERY 5 MIN PRN
Status: DISCONTINUED | OUTPATIENT
Start: 2024-07-28 | End: 2024-07-28 | Stop reason: HOSPADM

## 2024-07-28 RX ORDER — ONDANSETRON 2 MG/ML
4 INJECTION INTRAMUSCULAR; INTRAVENOUS ONCE
Status: COMPLETED | OUTPATIENT
Start: 2024-07-28 | End: 2024-07-28

## 2024-07-28 RX ORDER — NALOXONE HYDROCHLORIDE 0.4 MG/ML
0.08 INJECTION, SOLUTION INTRAMUSCULAR; INTRAVENOUS; SUBCUTANEOUS AS NEEDED
Status: DISCONTINUED | OUTPATIENT
Start: 2024-07-28 | End: 2024-07-28 | Stop reason: HOSPADM

## 2024-07-28 RX ORDER — HYDROMORPHONE HYDROCHLORIDE 1 MG/ML
0.5 INJECTION, SOLUTION INTRAMUSCULAR; INTRAVENOUS; SUBCUTANEOUS EVERY 30 MIN PRN
Status: DISCONTINUED | OUTPATIENT
Start: 2024-07-28 | End: 2024-07-28 | Stop reason: ALTCHOICE

## 2024-07-28 RX ORDER — PROCHLORPERAZINE EDISYLATE 5 MG/ML
5 INJECTION INTRAMUSCULAR; INTRAVENOUS EVERY 8 HOURS PRN
Status: DISCONTINUED | OUTPATIENT
Start: 2024-07-28 | End: 2024-07-28 | Stop reason: HOSPADM

## 2024-07-28 RX ORDER — DEXAMETHASONE SODIUM PHOSPHATE 4 MG/ML
VIAL (ML) INJECTION AS NEEDED
Status: DISCONTINUED | OUTPATIENT
Start: 2024-07-28 | End: 2024-07-28 | Stop reason: SURG

## 2024-07-28 RX ORDER — ONDANSETRON 2 MG/ML
4 INJECTION INTRAMUSCULAR; INTRAVENOUS EVERY 6 HOURS PRN
Status: DISCONTINUED | OUTPATIENT
Start: 2024-07-28 | End: 2024-07-28 | Stop reason: HOSPADM

## 2024-07-28 RX ORDER — HYDROMORPHONE HYDROCHLORIDE 1 MG/ML
0.2 INJECTION, SOLUTION INTRAMUSCULAR; INTRAVENOUS; SUBCUTANEOUS EVERY 2 HOUR PRN
Status: DISCONTINUED | OUTPATIENT
Start: 2024-07-28 | End: 2024-07-31

## 2024-07-28 RX ORDER — ONDANSETRON 2 MG/ML
INJECTION INTRAMUSCULAR; INTRAVENOUS AS NEEDED
Status: DISCONTINUED | OUTPATIENT
Start: 2024-07-28 | End: 2024-07-28 | Stop reason: SURG

## 2024-07-28 RX ADMIN — SODIUM CHLORIDE, SODIUM LACTATE, POTASSIUM CHLORIDE, CALCIUM CHLORIDE: 600; 310; 30; 20 INJECTION, SOLUTION INTRAVENOUS at 17:47:00

## 2024-07-28 RX ADMIN — DEXAMETHASONE SODIUM PHOSPHATE 4 MG: 4 MG/ML VIAL (ML) INJECTION at 17:56:00

## 2024-07-28 RX ADMIN — PHENYLEPHRINE HCL 100 MCG: 10 MG/ML VIAL (ML) INJECTION at 18:11:00

## 2024-07-28 RX ADMIN — ONDANSETRON 4 MG: 2 INJECTION INTRAMUSCULAR; INTRAVENOUS at 17:40:00

## 2024-07-28 RX ADMIN — LIDOCAINE HYDROCHLORIDE 50 MG: 10 INJECTION, SOLUTION EPIDURAL; INFILTRATION; INTRACAUDAL; PERINEURAL at 17:52:00

## 2024-07-28 RX ADMIN — ACETAMINOPHEN 1000 MG: 10 INJECTION, SOLUTION INTRAVENOUS at 18:05:00

## 2024-07-28 NOTE — ED NOTES
Spoke with pt miquel orozco and he was notified of culture results.  No further action required at this time

## 2024-07-28 NOTE — ED PROVIDER NOTES
Patient Seen in: [facility]      History     Chief Complaint   Patient presents with    Abdomen/Flank Pain    Chest Pain Angina     Stated Complaint: patient stated \"I have a kidney ston, body aches, muscle pain and fever\"    Subjective:   HPI    54-year-old female was diagnosed 3 days ago with a distal right ureteral stone of 3 mm diameter.  The patient was seen in the emergency department at that time with some right flank pain.  Urinalysis did not indicate evidence of urinary tract infection however the urine culture subsequently grew out E. coli, pansensitive which was received this morning.  The patient did follow-up with Dr. Giuspepe Kirkpatrick on Friday in the office.  She spiked a fever to 103 degrees yesterday and then was febrile to greater than 100 degrees today whereupon she called the urologist and was referred to the emergency department.  No vomiting.  Does acknowledge significant body aches.  She has been nauseated and has vomited.    Objective:   History reviewed. No pertinent past medical history.           Past Surgical History:   Procedure Laterality Date    Enlarge breast with implant      Jaw surgery      Removal gallbladder      Tonsillectomy                  Social History     Socioeconomic History    Marital status:    Tobacco Use    Smoking status: Never     Passive exposure: Never    Smokeless tobacco: Never   Vaping Use    Vaping status: Never Used   Substance and Sexual Activity    Alcohol use: Not Currently    Drug use: Not Currently     Social Determinants of Health     Food Insecurity: No Food Insecurity (7/28/2024)    Food Insecurity     Food Insecurity: Never true   Transportation Needs: No Transportation Needs (7/28/2024)    Transportation Needs     Lack of Transportation: No   Housing Stability: Low Risk  (7/28/2024)    Housing Stability     Housing Instability: No              Review of Systems    Positive for stated Chief Complaint: Abdomen/Flank Pain and Chest Pain  Angina    Other systems are as noted in HPI.  Constitutional and vital signs reviewed.      All other systems reviewed and negative except as noted above.    Physical Exam     ED Triage Vitals [07/28/24 1224]   /73   Pulse 106   Resp 20   Temp (!) 100.6 °F (38.1 °C)   Temp src Oral   SpO2 96 %   O2 Device None (Room air)       Current Vitals:   Vital Signs  BP: 120/67  Pulse: 98  Resp: 17  Temp: (!) 100.6 °F (38.1 °C)  Temp src: Oral    Oxygen Therapy  SpO2: 90 %  O2 Device: None (Room air)  Pulse Oximetry Type: Continuous  Pulse Ox Probe Location: Right hand            Physical Exam    General appearance: This is a middle-aged female sitting on a gurney.  Vital signs were reviewed per nurses notes.  Monitor reveals a sinus rhythm rate in the 90s.  Initial blood pressure was 120/67.  Temperature is 100.6.  Pulse oximetry is 90% on room air.  HEENT: Normocephalic atraumatic.  Anicteric sclera.  Oral mucosa is moist.  Oropharynx is normal.  Neck: No adenopathy or thyromegaly.  Lungs are clear to auscultation.  Heart exam: Normal S1-S2 without extra sounds or murmurs.  Regular rate and rhythm.  Abdomen is soft and nontender without masses or rebound.  Extremities are atraumatic.  Skin is dry without rashes or lesions.  Neuroexam: Alert and oriented x 4.  Speech is fluent.  Moving all 4 extremities well.    ED Course     Labs Reviewed   CBC, PLATELET; NO DIFFERENTIAL - Abnormal; Notable for the following components:       Result Value    WBC 11.7 (*)     All other components within normal limits   COMP METABOLIC PANEL (14) - Abnormal; Notable for the following components:    Glucose 113 (*)     Sodium 135 (*)     AST 75 (*)      (*)     Alkaline Phosphatase 116 (*)     All other components within normal limits   URINALYSIS, ROUTINE - Abnormal; Notable for the following components:    Clarity Urine Turbid (*)     Ketones Urine Trace (*)     Blood Urine Trace (*)     Protein Urine 100 (*)     Nitrite Urine 2+  (*)     Leukocyte Esterase Urine 500 (*)     WBC Urine >50 (*)     RBC Urine 6-10 (*)     Bacteria Urine 1+ (*)     Squamous Epi. Cells Few (*)     All other components within normal limits   CBC W/ DIFFERENTIAL - Abnormal; Notable for the following components:    WBC 11.7 (*)     Neutrophil Absolute Prelim 10.39 (*)     Neutrophil Absolute 10.39 (*)     Lymphocyte Absolute 0.41 (*)     All other components within normal limits   LACTIC ACID, PLASMA - Normal   PROTHROMBIN TIME (PT) - Normal   PTT, ACTIVATED - Normal   CBC WITH DIFFERENTIAL WITH PLATELET    Narrative:     The following orders were created for panel order CBC With Differential With Platelet.  Procedure                               Abnormality         Status                     ---------                               -----------         ------                     CBC W/ DIFFERENTIAL[[id number]]          Abnormal            Final result                 Please view results for these tests on the individual orders.   BLOOD CULTURE   BLOOD CULTURE   URINE CULTURE, ROUTINE     EKG    Rate, intervals and axes as noted on EKG Report.  Rate: 103  Rhythm: Sinus tachycardia  Reading: Possible left atrial enlargement.  Low voltage QRS.  Borderline EKG.  Agree with EKG report.  Other than heart rate there is no significant interval change compared to prior EKG of April of this year.                 Intravenous access was obtained.  Acetaminophen was given for fever.  Sepsis protocol was initiated after laboratory studies were drawn.  A 30 mL/kg bolus of normal saline was administered.  Intravenous Zosyn was given.    Case was discussed with [facility] on-call.  Dwayne hospitalist was also contacted via [employer] regarding hospitalization.    Patient has been drinking water up until 11 AM but no solid foods today.  She was kept NPO.    Plan is for the patient to go to the operating room for right ureteral stent placement.  Patient was admitted to a medical  bed for IV antibiotics with concern for bacteremia.         MDM      #1.  Ureteral lithiasis.  2.  Acute pyelonephritis.  Due to high fevers with the urinary obstruction and presence of urinary tract infection there is concern for bacteremia.  Admission disposition: 7/28/2024  1:15 PM                                     [facility]   [facility] of [facility]      Sepsis Reassessment Note    /67 (BP Location: Right arm)   Pulse 98   Temp (!) 100.6 °F (38.1 °C) (Oral)   Resp 17   Wt 66.7 kg   SpO2 90%   BMI 26.89 kg/m²      I completed the sepsis reassessment at 1400    Cardiac:  Regularity: Regular  Rate: Normal  Heart Sounds: S1,S2    Lungs:   Right: Clear  Left: Clear    Peripheral Pulses:  Radial: Right 1+ or Left 1+      Capillary Refill:  <3 Secs    Skin:  Temp/Moisture: Warm and Dry  Color: Normal      Shannan Reeder MD  7/28/2024  3:26 PM            MDM    Disposition and Plan     Clinical Impression:  1. Ureterolithiasis    2. Acute pyelonephritis         Disposition:  Admit  7/28/2024  1:15 pm    Follow-up:  No follow-up provider specified.        Medications Prescribed:  Current Discharge Medication List                            Hospital Problems       Present on Admission             ICD-10-CM Noted POA    * (Principal) Ureterolithiasis N20.1 7/28/2024 Unknown    Acute pyelonephritis N10 7/28/2024 Unknown

## 2024-07-28 NOTE — ED QUICK NOTES
Orders for admission, patient is aware of plan and ready to go upstairs. Any questions, please call ED RN padmaja at extension [phone].     Patient Covid vaccination status: Fully vaccinated     COVID Test Ordered in ED: None    COVID Suspicion at Admission: N/A    Running Infusions:    sodium chloride 1,000 mL (07/28/24 1312)        Mental Status/LOC at time of transport: A&Ox4    Other pertinent information:   CIWA score: N/A   NIH score:  N/A

## 2024-07-28 NOTE — ANESTHESIA PREPROCEDURE EVALUATION
PRE-OP EVALUATION    Patient Name: Thais Woodward    Admit Diagnosis: Ureterolithiasis [N20.1]  Acute pyelonephritis [N10]    Pre-op Diagnosis: Right ureteral stone [N20.1]    CYSTOSCOPY, RIGHT RETROGRADE PYELOGRAM, RIGHT URETERAL STENT PLACEMENT, POSSIBLE  URETEROSCOPY    Anesthesia Procedure: CYSTOSCOPY, RIGHT RETROGRADE PYELOGRAM, RIGHT URETERAL STENT PLACEMENT, POSSIBLE  URETEROSCOPY (Right)    Surgeons and Role:     * Louie Garner MD - Primary    Pre-op vitals reviewed.  Temp: 102.9 °F (39.4 °C)  Pulse: 95  Resp: 17  BP: 120/67  SpO2: 90 %  Body mass index is 26.89 kg/m².    Current medications reviewed.  Hospital Medications:   [] sodium chloride 0.9 % IV bolus 2,001 mL  30 mL/kg Intravenous Continuous    [COMPLETED] piperacillin-tazobactam (Zosyn) 4.5 g in dextrose 5% 100 mL IVPB-ADDV  4.5 g Intravenous Once    [Transfer Hold] acetaminophen (Tylenol Extra Strength) tab 1,000 mg  1,000 mg Oral Once    [COMPLETED] ondansetron (Zofran) 4 MG/2ML injection 4 mg  4 mg Intravenous Once    [Transfer Hold] acetaminophen (Tylenol Extra Strength) tab 500 mg  500 mg Oral Q4H PRN    [Transfer Hold] HYDROmorphone (Dilaudid) 1 MG/ML injection 0.2 mg  0.2 mg Intravenous Q2H PRN    Or    [Transfer Hold] HYDROmorphone (Dilaudid) 1 MG/ML injection 0.4 mg  0.4 mg Intravenous Q2H PRN    Or    [Transfer Hold] HYDROmorphone (Dilaudid) 1 MG/ML injection 0.8 mg  0.8 mg Intravenous Q2H PRN    [Transfer Hold] melatonin tab 3 mg  3 mg Oral Nightly PRN    [Transfer Hold] polyethylene glycol (PEG 3350) (Miralax) 17 g oral packet 17 g  17 g Oral Daily PRN    [Transfer Hold] sennosides (Senokot) tab 17.2 mg  17.2 mg Oral Nightly PRN    [Transfer Hold] bisacodyl (Dulcolax) 10 MG rectal suppository 10 mg  10 mg Rectal Daily PRN    [Transfer Hold] fleet enema (Fleet) 7-19 GM/118ML rectal enema 133 mL  1 enema Rectal Once PRN    [Transfer Hold] ondansetron (Zofran) 4 MG/2ML injection 4 mg  4 mg Intravenous Q6H PRN     [Transfer Hold] prochlorperazine (Compazine) 10 MG/2ML injection 5 mg  5 mg Intravenous Q8H PRN    [Transfer Hold] potassium chloride 40 mEq in 250mL sodium chloride 0.9% IVPB premix  40 mEq Intravenous Once    piperacillin-tazobactam (Zosyn) 3.375 g in dextrose 5% 100 mL IVPB-ADDV  3.375 g Intravenous Q8H    ondansetron (Zofran) 4 MG/2ML injection   Intravenous PRN    lidocaine PF (Xylocaine-MPF) 1% injection   Intravenous PRN    propofol (Diprivan) 10 MG/ML injection   Intravenous PRN    propofol (Diprivan) 10 mg/mL infusion premix   Intravenous Continuous PRN    dexamethasone (Decadron) 4 MG/ML injection   Intravenous PRN    lactated ringers infusion   Intravenous Continuous PRN       Outpatient Medications:     Medications Prior to Admission   Medication Sig Dispense Refill Last Dose    valACYclovir 1 G Oral Tab Take 1 tablet (1,000 mg total) by mouth every 12 (twelve) hours. Unsure of dose   2024    ondansetron 4 MG Oral Tablet Dispersible Take 1 tablet (4 mg total) by mouth every 4 (four) hours as needed for Nausea. 10 tablet 0     MELOXICAM OR Take by mouth.       dicyclomine 20 MG Oral Tab Take 1 tablet (20 mg total) by mouth 4 (four) times daily as needed. 15 tablet 0     [] pantoprazole 40 MG Oral Tab EC Take 1 tablet (40 mg total) by mouth daily. 30 tablet 0     sucralfate 1 g Oral Tab Take 1 tablet (1 g total) by mouth 4 (four) times daily before meals and nightly. 30 tablet 0     omeprazole 20 MG Oral Capsule Delayed Release Take 1 capsule (20 mg total) by mouth every morning before breakfast. (Patient not taking: Reported on 4/3/2024)       fexofenadine 60 MG Oral Tab Take 1 tablet (60 mg total) by mouth daily. Unsure of dose (Patient not taking: Reported on 4/3/2024)          Allergies: Codeine and Fentanyl      Anesthesia Evaluation    Patient summary reviewed.    Anesthetic Complications  (+) history of anesthetic complications  History of: PONV       GI/Hepatic/Renal             (+)  chronic renal disease                    Cardiovascular                                                       Endo/Other    Negative endo/other ROS.                              Pulmonary    Negative pulmonary ROS.                       Neuro/Psych    Negative neuro/psych ROS.                                  Past Surgical History:   Procedure Laterality Date    Enlarge breast with implant      Jaw surgery      Removal gallbladder      Tonsillectomy       Social History     Socioeconomic History    Marital status:    Tobacco Use    Smoking status: Never     Passive exposure: Never    Smokeless tobacco: Never   Vaping Use    Vaping status: Never Used   Substance and Sexual Activity    Alcohol use: Not Currently    Drug use: Not Currently     History   Drug Use Unknown     Available pre-op labs reviewed.  Lab Results   Component Value Date    WBC 11.7 (H) 07/28/2024    WBC 11.7 (H) 07/28/2024    RBC 3.80 07/28/2024    RBC 3.80 07/28/2024    HGB 12.2 07/28/2024    HGB 12.2 07/28/2024    HCT 35.7 07/28/2024    HCT 35.7 07/28/2024    MCV 93.9 07/28/2024    MCV 93.9 07/28/2024    MCH 32.1 07/28/2024    MCH 32.1 07/28/2024    MCHC 34.2 07/28/2024    MCHC 34.2 07/28/2024    RDW 13.1 07/28/2024    RDW 13.1 07/28/2024    .0 07/28/2024    .0 07/28/2024     Lab Results   Component Value Date     (L) 07/28/2024    K 3.6 07/28/2024     07/28/2024    CO2 28.0 07/28/2024    BUN 15 07/28/2024    CREATSERUM 0.83 07/28/2024     (H) 07/28/2024    CA 9.3 07/28/2024     Lab Results   Component Value Date    INR 1.09 07/28/2024         Airway      Mallampati: III  Mouth opening: 3 FB  TM distance: 4 - 6 cm   Cardiovascular             Dental  Comment: No loose teeth per patient               Pulmonary                     Other findings              ASA: 2 and emergent  Plan: general  NPO status verified and     Post-procedure pain management plan discussed with surgeon and patient.    Comment:  GETA/LMA discussed in detail.  Risk of complications discussed including but not limited to sore throat, cough, PONV discussed. Also, discussed risks including dental injury particularly on any weakened, treated or diseased teeth & pt wishes to proceed  All questions answered.    Plan/risks discussed with: patient                Present on Admission:  **None**

## 2024-07-28 NOTE — OPERATIVE REPORT
[facility]   part of [facility]    Operative Note         Thais Woodward Location: OR   [facility] [id number] MRN [id number]   Admission Date 7/28/2024 Operation Date 7/28/2024   Attending Physician Michelle Arnold MD       Patient Name: Thais Woodward     Preoperative Diagnosis: Right ureteral stone [N20.1]     Postoperative Diagnosis: Right ureteral stone [N20.1]     Procedure(s):  CYSTOSCOPY, RIGHT RETROGRADE PYELOGRAM, RIGHT URETERAL STENT PLACEMENT,  URETEROSCOPY RIGHT     Primary Surgeon: Louie Garner MD           Anesthesia: General     Specimen: * No specimens in log *     Estimated Blood Loss: No data recorded     Complications: none      Indications for procedure: Fever and stone     Surgical Findings: Very narrow ureter anf not able to get the URS up more then  4-5 cm      Complexity:  (optional)    Operative Summary:     Patient was identified in the operating room table.  She was draped and prepped in usual sterile fashion in lithotomy position.  She underwent a standard timeout procedure received IV Zosyn and Venodyne's within an hour of surgery.  Initially performed rigid cystoscopy and noted a normal right ureteral orifice.  The the UO was not inflamed or red consistent with a passed stone.  We were able to easily cannulate the distal ureter and performed very gentle retrograde urogram noting some narrowness of the ureter approximately 5 cm to 6 cm above the ureteral orifice but no clear filling defect and and very mild hydronephrosis.  Overt open-ended stent we placed a guidewire up into the kidney under fluoroscopy and then we looked with extort [place] ureteroscope up the right ureter we were able to look up about 5 to 6 cm but the ureter was very tight at this level and could not proceed any more proximally secondary to the stenosis.  Of note we performed ureteroscopy under gravity and did not use pressure.  Because of the ureteral stenosis and concern from her previous fevers we  decided to abort and place a 6 x 24 double-J stent.  Good curls in the kidney and bladder were noted under fluoroscopy.  We did not leave a string on with the plan that stent may need to stay more than a week or so.  Patient was subsequently transferred to the recovery room in stable condition.        Implants:   Implant Name Type Inv. Item Serial No.  Lot No. LRB No. Used Action   STENT URET [id number] ASCERTA - SNA  STENT URET [id number] ASCERTA NA [employer] WD [id number] 1 Implanted        Drains: 6  x  24     Condition: Stable        Louie Garner MD

## 2024-07-28 NOTE — ED INITIAL ASSESSMENT (HPI)
Pt to ED from  for a confirmed Kidney stone last friday  Pt state that she has been febrile, extreme body pain, and chills.  Pt spoke with urologist and was told to come to ER if fever persisted.

## 2024-07-28 NOTE — H&P
[facility]     Thais Woodward Patient Status:  Inpatient    1970 MRN [id number]   Location [facility] PRE OP HOLDING Attending Michelle Arnold MD   Hosp Day # 0 PCP Garrison Charles MD     Chief Complaint: History of nephrolithiasis fever chills flank pain    Subjective:    History of Present Illness:     Thais Woodward is a 54 year old female with history of nephrolithiasis who presents to emergency room to be evaluated for fever chills.  She initially presented to emergency room on  and she was diagnosed with distal right ureteral stone 3 mm in diameter.  Patient follows up with urology as an outpatient.  She states that she spiked fever 103 yesterday and today and she started to have significant body pains and bilateral flanks pain 5 6 out of 10 no radiation and cramping in nature.  She called her urologist and she was advised to come to emergency room to be evaluated.  Her CBC shows mildly elevated WBC with left shift.  AST and ALT are mildly elevated and sodium mildly decreased at 135.  UA shows 2+ nitrates and greater than 50 WBCs.  Urine culture from  shows E. coli pansensitive.    History/Other:    Past Medical History:  History reviewed. No pertinent past medical history.  Past Surgical History:   Past Surgical History:   Procedure Laterality Date    Enlarge breast with implant      Jaw surgery      Removal gallbladder      Tonsillectomy        Family History:   History reviewed. No pertinent family history.  Social History:    reports that she has never smoked. She has never been exposed to tobacco smoke. She has never used smokeless tobacco. She reports that she does not currently use alcohol. She reports that she does not currently use drugs.     Allergies:   Allergies   Allergen Reactions    Codeine NAUSEA AND VOMITING    Fentanyl NAUSEA AND VOMITING       Medications:    Current Facility-Administered Medications on File Prior to Encounter    Medication Dose Route Frequency Provider Last Rate Last Admin    [COMPLETED] ketorolac (Toradol) 30 MG/ML injection 30 mg  30 mg Intravenous Once Chano Adams MD   30 mg at 24    [COMPLETED] ondansetron (Zofran) 4 MG/2ML injection 4 mg  4 mg Intravenous Once Chano Adams MD   4 mg at 24    [COMPLETED] sodium chloride 0.9 % IV bolus 1,000 mL  1,000 mL Intravenous Once Chano Adams MD   Stopped at 24     Current Outpatient Medications on File Prior to Encounter   Medication Sig Dispense Refill    valACYclovir 1 G Oral Tab Take 1 tablet (1,000 mg total) by mouth every 12 (twelve) hours. Unsure of dose      ondansetron 4 MG Oral Tablet Dispersible Take 1 tablet (4 mg total) by mouth every 4 (four) hours as needed for Nausea. 10 tablet 0    MELOXICAM OR Take by mouth.      dicyclomine 20 MG Oral Tab Take 1 tablet (20 mg total) by mouth 4 (four) times daily as needed. 15 tablet 0    [] pantoprazole 40 MG Oral Tab EC Take 1 tablet (40 mg total) by mouth daily. 30 tablet 0    sucralfate 1 g Oral Tab Take 1 tablet (1 g total) by mouth 4 (four) times daily before meals and nightly. 30 tablet 0    omeprazole 20 MG Oral Capsule Delayed Release Take 1 capsule (20 mg total) by mouth every morning before breakfast. (Patient not taking: Reported on 4/3/2024)      fexofenadine 60 MG Oral Tab Take 1 tablet (60 mg total) by mouth daily. Unsure of dose (Patient not taking: Reported on 4/3/2024)         Review of Systems:   A comprehensive review of systems was completed.    Pertinent positives and negatives noted in the HPI.    Objective:   Physical Exam:    /67 (BP Location: Right arm)   Pulse 95   Temp (!) 102.9 °F (39.4 °C) (Oral)   Resp 17   Wt 147 lb (66.7 kg)   SpO2 90%   BMI 26.89 kg/m²   General: No acute distress, Alert  Respiratory: No rhonchi, no wheezes  Cardiovascular: S1, S2. Regular rate and rhythm  Abdomen: Soft, Non-tender, non-distended, positive bowel sounds  Neuro:  No new focal deficits  Extremities: No edema      Results:    Labs:      Labs Last 24 Hours:    Recent Labs   Lab 07/28/24  1237   RBC 3.80  3.80   HGB 12.2  12.2   HCT 35.7  35.7   MCV 93.9  93.9   MCH 32.1  32.1   MCHC 34.2  34.2   RDW 13.1  13.1   NEPRELIM 10.39*   WBC 11.7*  11.7*   .0  226.0       Recent Labs   Lab 07/28/24  1237   *   BUN 15   CREATSERUM 0.83   EGFRCR 84   CA 9.3   ALB 4.7   *   K 3.6      CO2 28.0   ALKPHO 116*   AST 75*   *   BILT 0.5   TP 7.4       Lab Results   Component Value Date    INR 1.09 07/28/2024       No results for input(s): \"TROP\", \"TROPHS\", \"CK\" in the last 168 hours.    No results for input(s): \"TROP\", \"PBNP\" in the last 168 hours.    No results for input(s): \"PCT\" in the last 168 hours.    Imaging: Imaging data reviewed in Epic.    Assessment & Plan:      # 54-year-old female with nephrolithiasis  -Keep n.p.o.  -Dilaudid IV as needed for pain  -Patient states that Toradol gives her palpitations  -Continue antibiotics IV fluids and urology eval for cystoscopy    # History of GERD        Plan of care discussed with patient and emergency room physician    Michelle Arnold MD    Supplementary Documentation:     The 21st Century Cures Act makes medical notes like these available to patients in the interest of transparency. Please be advised this is a medical document. Medical documents are intended to carry relevant information, facts as evident, and the clinical opinion of the practitioner. The medical note is intended as peer to peer communication and may appear blunt or direct. It is written in medical language and may contain abbreviations or verbiage that are unfamiliar.

## 2024-07-28 NOTE — CONSULTS
[facility] CONSULT NOTE     Thais Woodward Patient Status:  Inpatient    1970 MRN [id number]   [facility] PRE OP HOLDING Attending Michelle Arnold MD   Hosp Day # 0 PCP Garrison Charles MD     Reason for Consultation:  K stone and fever     History of Present Illness:  Thais Woodward is a a(n) 54 year old female. Distal small stone on right   Having fevers and pain     History:  History reviewed. No pertinent past medical history.  Past Surgical History:   Procedure Laterality Date    Enlarge breast with implant      Jaw surgery      Removal gallbladder      Tonsillectomy       History reviewed. No pertinent family history.   reports that she has never smoked. She has never been exposed to tobacco smoke. She has never used smokeless tobacco. She reports that she does not currently use alcohol. She reports that she does not currently use drugs.    Allergies:  Allergies   Allergen Reactions    Codeine NAUSEA AND VOMITING    Fentanyl NAUSEA AND VOMITING       Medications:    Current Facility-Administered Medications:     [Transfer Hold] acetaminophen (Tylenol Extra Strength) tab 1,000 mg, 1,000 mg, Oral, Once    [Transfer Hold] acetaminophen (Tylenol Extra Strength) tab 500 mg, 500 mg, Oral, Q4H PRN    [Transfer Hold] HYDROmorphone (Dilaudid) 1 MG/ML injection 0.2 mg, 0.2 mg, Intravenous, Q2H PRN **OR** [Transfer Hold] HYDROmorphone (Dilaudid) 1 MG/ML injection 0.4 mg, 0.4 mg, Intravenous, Q2H PRN **OR** [Transfer Hold] HYDROmorphone (Dilaudid) 1 MG/ML injection 0.8 mg, 0.8 mg, Intravenous, Q2H PRN    [Transfer Hold] melatonin tab 3 mg, 3 mg, Oral, Nightly PRN    [Transfer Hold] polyethylene glycol (PEG 3350) (Miralax) 17 g oral packet 17 g, 17 g, Oral, Daily PRN    [Transfer Hold] sennosides (Senokot) tab 17.2 mg, 17.2 mg, Oral, Nightly PRN    [Transfer Hold] bisacodyl (Dulcolax) 10 MG rectal suppository 10 mg, 10 mg, Rectal, Daily PRN    [Transfer Hold] fleet enema (Fleet) 7-19 GM/118ML  rectal enema 133 mL, 1 enema, Rectal, Once PRN    [Transfer Hold] ondansetron (Zofran) 4 MG/2ML injection 4 mg, 4 mg, Intravenous, Q6H PRN    [Transfer Hold] prochlorperazine (Compazine) 10 MG/2ML injection 5 mg, 5 mg, Intravenous, Q8H PRN    [Transfer Hold] potassium chloride 40 mEq in 250mL sodium chloride 0.9% IVPB premix, 40 mEq, Intravenous, Once    piperacillin-tazobactam (Zosyn) 3.375 g in dextrose 5% 100 mL IVPB-ADDV, 3.375 g, Intravenous, Q8H    Review of Systems:  Pertinent items are noted in HPI.    Physical Exam:  /67 (BP Location: Right arm)   Pulse 95   Temp (!) 102.9 °F (39.4 °C) (Oral)   Resp 17   Wt 147 lb (66.7 kg)   SpO2 90%   BMI 26.89 kg/m²   GENERAL: well developed, well nourished, no apparent distress  EYES: sclera non-icteric, no redness   MOUTH:  moist oral mucosa, no lesions  LUNGS: normal respiratory motion without distress  GI: Abdomen soft without organomegally, no tenderness, normal bowel sounds, No CVAT     RUQ  tenderness to palp      NEURO: Alert and Oriented, motor and sensory grossly non-focal   LYMPH:  No appreciable axillary, neck, or inguinal  Adenopathy  SKIN: No rashes, no discoloration  EXTREMITIES: No edema or cyanosis, no calf pain, no appreciable joint deformities     Laboratory Data:  Lab Results   Component Value Date    WBC 11.7 07/28/2024    WBC 11.7 07/28/2024    HGB 12.2 07/28/2024    HGB 12.2 07/28/2024    HCT 35.7 07/28/2024    HCT 35.7 07/28/2024    .0 07/28/2024    .0 07/28/2024    CREATSERUM 0.83 07/28/2024    BUN 15 07/28/2024     07/28/2024    K 3.6 07/28/2024     07/28/2024    CO2 28.0 07/28/2024     07/28/2024    CA 9.3 07/28/2024    ALB 4.7 07/28/2024    ALKPHO 116 07/28/2024    BILT 0.5 07/28/2024    TP 7.4 07/28/2024    AST 75 07/28/2024     07/28/2024    PTT 26.5 07/28/2024    INR 1.09 07/28/2024    PTP 14.1 07/28/2024     Lab Results   Component Value Date    COLORUR Yellow 07/28/2024    CLARITY Turbid  2024    SPECGRAVITY 1.021 2024    GLUUR Normal 2024    BILUR Negative 2024    KETUR Trace 2024    BLOODURINE Trace 2024    PHURINE 6.0 2024    PROUR 100 2024    UROBILINOGEN Normal 2024    NITRITE 2+ 2024    LEUUR 500 2024     No results found for: \"PSA\"      Intake/Output Summary (Last 24 hours) at 2024 1732  Last data filed at 2024 1422  Gross per 24 hour   Intake 600 ml   Output --   Net 600 ml         Imagin.  Mild right hydronephrosis secondary to a 2-3 mm obstructing stone within the right distal ureter.       2. Nonobstructing bilateral kidney stones.      3. Stable fibroid uterus.     Impression and Plan:  Patient Active Problem List   Diagnosis    Ureterolithiasis    Acute pyelonephritis        R ureteral stone  R  4mm,  3mm   LEFT   3 mm   UTI and fevers -- E  Coli    Pan Sens       Will plan on  URS o  R - quick. If any issue or delay will just place a JJ stent     All risks, benefits and alternatives to surgery were discusssed in detail with the patient. Complications such as urosepsis, bleeding, infection, hematuria, urinary retention, clot retention, and the rare risk of urethral, bladder and/ or ureteral injury were detailed to the patient. The patient was given ample time to ask questions. All questions were answered.  After weighing the risks, benefits and alternatives, patient elects to proceed with surgery as planned.       UA  - dirty     On  Zosyn --   would change to Ancef  and go Home Keflex or Bactrim    x 10 d      Thank you for allowing me to participate in the care of your patient.    Please give me a call on my cell if you have any questions of concerns.     Josep Garner MD   [facility]  Cell: [phone]  Office :[phone]        2024  5:32 PM

## 2024-07-28 NOTE — ANESTHESIA PROCEDURE NOTES
Airway  Date/Time: 7/28/2024 5:55 PM  Urgency: elective      General Information and Staff    Patient location during procedure: OR  Anesthesiologist: Lorenzo Elizabeth MD  Performed: anesthesiologist   Performed by: Lorenzo Elizabeth MD  Authorized by: Lorenzo Elizabeth MD      Indications and Patient Condition  Indications for airway management: anesthesia  Sedation level: deep  Preoxygenated: yes  Patient position: sniffing  Mask difficulty assessment: 1 - vent by mask    Final Airway Details  Final airway type: supraglottic airway      Successful airway: classic  Size 3       Number of attempts at approach: 1

## 2024-07-28 NOTE — ANESTHESIA POSTPROCEDURE EVALUATION
[facility]    [place] Patient Status:  Inpatient   Age/Gender 54 year old female MRN [id number]   Location [facility] POST ANESTHESIA CARE UNIT Attending Michelle Arnold MD   Hosp Day # 0 PCP Garrison Charles MD       Anesthesia Post-op Note    CYSTOSCOPY, RIGHT RETROGRADE PYELOGRAM, RIGHT URETERAL STENT PLACEMENT,  URETEROSCOPY    Procedure Summary       Date: 07/28/24 Room / Location:  MAIN OR  /  MAIN OR    Anesthesia Start: 1740 Anesthesia Stop: 1832    Procedure: CYSTOSCOPY, RIGHT RETROGRADE PYELOGRAM, RIGHT URETERAL STENT PLACEMENT,  URETEROSCOPY (Right) Diagnosis:       Right ureteral stone      (Right ureteral stone [N20.1])    Surgeons: Louie Garner MD Anesthesiologist: Lorenzo Elizabeth MD    Anesthesia Type: general ASA Status: 2 - Emergent            Anesthesia Type: general    Vitals Value Taken Time   BP 91/50 07/28/24 1829   Temp 102 07/28/24 1832   Pulse 105 07/28/24 1832   Resp 23 07/28/24 1832   SpO2 95 % 07/28/24 1832   Vitals shown include unfiled device data.    Patient Location: PACU    Anesthesia Type: general    Airway Patency: patent    Postop Pain Control: adequate    Mental Status: mildly sedated but able to meaningfully participate in the post-anesthesia evaluation    Nausea/Vomiting: none    Cardiopulmonary/Hydration status: stable euvolemic    Complications: no apparent anesthesia related complications    Postop vital signs: stable    Dental Exam: Unchanged from Preop    Patient to be discharged from PACU when criteria met.

## 2024-07-28 NOTE — PROGRESS NOTES
Patient taken down to OR Holding by bed. OR Charge RN notified that the patient's current oral temperature is 102.9 degrees, urine pregnancy test pending.

## 2024-07-29 ENCOUNTER — APPOINTMENT (OUTPATIENT)
Dept: CT IMAGING | Facility: HOSPITAL | Age: 54
End: 2024-07-29
Attending: PHYSICIAN ASSISTANT
Payer: COMMERCIAL

## 2024-07-29 LAB
ALBUMIN SERPL-MCNC: 4 G/DL (ref 3.2–4.8)
ALBUMIN/GLOB SERPL: 1.5 {RATIO} (ref 1–2)
ALP LIVER SERPL-CCNC: 139 U/L
ALT SERPL-CCNC: 141 U/L
ANION GAP SERPL CALC-SCNC: 4 MMOL/L (ref 0–18)
AST SERPL-CCNC: 67 U/L (ref ?–34)
BILIRUB SERPL-MCNC: 0.4 MG/DL (ref 0.3–1.2)
BLACTX-M ISLT/SPM QL: NOT DETECTED
BUN BLD-MCNC: 14 MG/DL (ref 9–23)
CALCIUM BLD-MCNC: 9.1 MG/DL (ref 8.7–10.4)
CHLORIDE SERPL-SCNC: 106 MMOL/L (ref 98–112)
CO2 SERPL-SCNC: 28 MMOL/L (ref 21–32)
CREAT BLD-MCNC: 0.8 MG/DL
E COLI DNA BLD POS QL NAA+NON-PROBE: DETECTED
EGFRCR SERPLBLD CKD-EPI 2021: 88 ML/MIN/1.73M2 (ref 60–?)
GLOBULIN PLAS-MCNC: 2.6 G/DL (ref 2–3.5)
GLUCOSE BLD-MCNC: 112 MG/DL (ref 70–99)
OSMOLALITY SERPL CALC.SUM OF ELEC: 287 MOSM/KG (ref 275–295)
POTASSIUM SERPL-SCNC: 3.5 MMOL/L (ref 3.5–5.1)
POTASSIUM SERPL-SCNC: 3.7 MMOL/L (ref 3.5–5.1)
PROT SERPL-MCNC: 6.6 G/DL (ref 5.7–8.2)
SODIUM SERPL-SCNC: 138 MMOL/L (ref 136–145)

## 2024-07-29 PROCEDURE — 99232 SBSQ HOSP IP/OBS MODERATE 35: CPT | Performed by: HOSPITALIST

## 2024-07-29 PROCEDURE — 74176 CT ABD & PELVIS W/O CONTRAST: CPT | Performed by: PHYSICIAN ASSISTANT

## 2024-07-29 RX ORDER — PHENAZOPYRIDINE HYDROCHLORIDE 100 MG/1
100 TABLET, FILM COATED ORAL
Status: DISPENSED | OUTPATIENT
Start: 2024-07-29 | End: 2024-07-31

## 2024-07-29 RX ORDER — POTASSIUM CHLORIDE 20 MEQ/1
40 TABLET, EXTENDED RELEASE ORAL ONCE
Status: DISCONTINUED | OUTPATIENT
Start: 2024-07-29 | End: 2024-07-31

## 2024-07-29 NOTE — PROGRESS NOTES
[facility]   part of [facility]     Hospitalist Progress Note     Thais Woodward Patient Status:  Inpatient    1970 MRN [id number]   Location [facility] 3NW-A Attending Michelle Arnold MD   Hosp Day # 1 PCP Garrison Charles MD     Chief Complaint: kidney stone, bacteremia    Subjective:     Patient reports left sided flank pain today    Objective:    Review of Systems:   A comprehensive review of systems was completed; pertinent positive and negatives stated in subjective.    Vital signs:  Temp:  [97.8 °F (36.6 °C)-102.9 °F (39.4 °C)] 98.1 °F (36.7 °C)  Pulse:  [] 58  Resp:  [8-26] 18  BP: ()/(41-73) 109/67  SpO2:  [90 %-98 %] 94 %    Physical Exam:    General: No acute distress  Respiratory: No wheezes, no rhonchi  Cardiovascular: S1, S2, regular rate and rhythm  Abdomen: Soft, Non-tender, non-distended, positive bowel sounds  Neuro: No new focal deficits.   Extremities: No edema      Diagnostic Data:    Labs:  Recent Labs   Lab 24  1237 24  1321   WBC 11.7*  11.7*  --    HGB 12.2  12.2  --    MCV 93.9  93.9  --    .0  226.0  --    INR  --  1.09       Recent Labs   Lab 24  1237 24  0513   *  --    BUN 15  --    CREATSERUM 0.83  --    CA 9.3  --    ALB 4.7  --    *  --    K 3.6 3.7     --    CO2 28.0  --    ALKPHO 116*  --    AST 75*  --    *  --    BILT 0.5  --    TP 7.4  --        Estimated Creatinine Clearance: 61.3 mL/min (based on SCr of 0.83 mg/dL).    No results for input(s): \"TROP\", \"TROPHS\", \"CK\" in the last 168 hours.    Recent Labs   Lab 24  1321   PTP 14.1   INR 1.09            Microbiology    Hospital Encounter on 24   1. Blood Culture     Status: Abnormal (Preliminary result)    Collection Time: 24  1:21 PM    Specimen: Blood,peripheral   Result Value Ref Range    Blood Culture Result Positive N/A    Blood Smear Positive Blood Culture (A) N/A    Blood Smear Gram Negative Rods (A) N/A          Imaging: Reviewed in Epic.    Medications:    potassium chloride  40 mEq Oral Once    acetaminophen  1,000 mg Oral Once    potassium chloride  40 mEq Intravenous Once    piperacillin-tazobactam  3.375 g Intravenous Q8H    enoxaparin  40 mg Subcutaneous Daily       Assessment & Plan:      #Right ureteral stone  -s/p cysto and stent  -having left sided flank pain today,  CT reviewed and negative for acute changes    #UTI  #E.coli bacteremia  #Sepsis- due to above ( fever, hypotension, UTI)  -IVF  -follow final Cx results  -continue Abx  -afebrile    #Elevated LFTs  -could be related to sepsis   -recheck today  -CT showing liver cysts, no biliary dilatation    Elizabeth Parmar MD    Supplementary Documentation:     Quality:  DVT Mechanical Prophylaxis:   SCDs, Early ambuation  DVT Pharmacologic Prophylaxis   Medication    enoxaparin (Lovenox) 40 MG/0.4ML SUBQ injection 40 mg                Code Status: Not on file  Lee: No urinary catheter in place  Lee Duration (in days):   Central line:    CLAU:     Discharge is dependent on: progress  At this point Ms. Woodward is expected to be discharge to: home    The 21st Century Cures Act makes medical notes like these available to patients in the interest of transparency. Please be advised this is a medical document. Medical documents are intended to carry relevant information, facts as evident, and the clinical opinion of the practitioner. The medical note is intended as peer to peer communication and may appear blunt or direct. It is written in medical language and may contain abbreviations or verbiage that are unfamiliar.

## 2024-07-29 NOTE — PLAN OF CARE
Pt here from: Home with    Neuro: A&Ox4, VSS, RA, IS. Denies cough, chest pain, and SOB.  GI: Abdomen soft, passing gas and belching. Denies nausea.   : Voids freely in bathroom. Straining urine. Pt c/o of bladder spasms, ditropan given. Paged urology for pyridium.   Pt declines pain medication at this time.   Up ad hermes in room.   Diet: Tolerating regular diet .  IVF running per order through PIV R AC.    All appropriate safety measures in place. All questions and concerns addressed. Bed locked and in lowest position. Call light in reach.

## 2024-07-29 NOTE — PLAN OF CARE
A&Ox4. VSS. RA. Sepsis BPA was triggered, Dr. Ruby was made aware and ordered a lactic acid level, which resulted at 0.9.   GI: Abdomen soft, flat, nondistended. Passing gas.  Denies nausea.  : She complains of urinary frequency and bladder spasms. Attempting to provide relief with prn Ditropan. Her urine is red with small blood clots. She had a stent placed in the OR with Dr. Garner.  Patient has not complained of pain, she is more bothered my urinary frequency.  She required standby assistance after returning to the unit from PACU, but is now getting up independently.  Diet: She returned from PACU with regular diet orders. Encouraged patient to attempt clear liquids for now and advance the diet slowly to check for tolerance. She tolerated clear liquids as well as some saltine and margaret crackers.  IVF running per order. She is on IV zosyn.  All appropriate safety measures in place. All questions and concerns addressed to the best of my ability.

## 2024-07-30 LAB
ALBUMIN SERPL-MCNC: 3.6 G/DL (ref 3.2–4.8)
ALBUMIN/GLOB SERPL: 1.4 {RATIO} (ref 1–2)
ALP LIVER SERPL-CCNC: 120 U/L
ALT SERPL-CCNC: 105 U/L
ANION GAP SERPL CALC-SCNC: 5 MMOL/L (ref 0–18)
AST SERPL-CCNC: 38 U/L (ref ?–34)
BASOPHILS # BLD AUTO: 0.02 X10(3) UL (ref 0–0.2)
BASOPHILS NFR BLD AUTO: 0.2 %
BILIRUB SERPL-MCNC: 0.3 MG/DL (ref 0.3–1.2)
BUN BLD-MCNC: 18 MG/DL (ref 9–23)
CALCIUM BLD-MCNC: 9.2 MG/DL (ref 8.7–10.4)
CHLORIDE SERPL-SCNC: 107 MMOL/L (ref 98–112)
CO2 SERPL-SCNC: 27 MMOL/L (ref 21–32)
CREAT BLD-MCNC: 0.77 MG/DL
EGFRCR SERPLBLD CKD-EPI 2021: 92 ML/MIN/1.73M2 (ref 60–?)
EOSINOPHIL # BLD AUTO: 0.02 X10(3) UL (ref 0–0.7)
EOSINOPHIL NFR BLD AUTO: 0.2 %
ERYTHROCYTE [DISTWIDTH] IN BLOOD BY AUTOMATED COUNT: 13.2 %
GLOBULIN PLAS-MCNC: 2.6 G/DL (ref 2–3.5)
GLUCOSE BLD-MCNC: 106 MG/DL (ref 70–99)
HCT VFR BLD AUTO: 30.8 %
HGB BLD-MCNC: 10.3 G/DL
IMM GRANULOCYTES # BLD AUTO: 0.04 X10(3) UL (ref 0–1)
IMM GRANULOCYTES NFR BLD: 0.3 %
LYMPHOCYTES # BLD AUTO: 1.57 X10(3) UL (ref 1–4)
LYMPHOCYTES NFR BLD AUTO: 13.6 %
MCH RBC QN AUTO: 31.4 PG (ref 26–34)
MCHC RBC AUTO-ENTMCNC: 33.4 G/DL (ref 31–37)
MCV RBC AUTO: 93.9 FL
MONOCYTES # BLD AUTO: 0.95 X10(3) UL (ref 0.1–1)
MONOCYTES NFR BLD AUTO: 8.2 %
NEUTROPHILS # BLD AUTO: 8.96 X10 (3) UL (ref 1.5–7.7)
NEUTROPHILS # BLD AUTO: 8.96 X10(3) UL (ref 1.5–7.7)
NEUTROPHILS NFR BLD AUTO: 77.5 %
OSMOLALITY SERPL CALC.SUM OF ELEC: 290 MOSM/KG (ref 275–295)
PLATELET # BLD AUTO: 239 10(3)UL (ref 150–450)
POTASSIUM SERPL-SCNC: 3.2 MMOL/L (ref 3.5–5.1)
POTASSIUM SERPL-SCNC: 3.2 MMOL/L (ref 3.5–5.1)
PROT SERPL-MCNC: 6.2 G/DL (ref 5.7–8.2)
RBC # BLD AUTO: 3.28 X10(6)UL
SODIUM SERPL-SCNC: 139 MMOL/L (ref 136–145)
WBC # BLD AUTO: 11.6 X10(3) UL (ref 4–11)

## 2024-07-30 PROCEDURE — 99232 SBSQ HOSP IP/OBS MODERATE 35: CPT | Performed by: HOSPITALIST

## 2024-07-30 NOTE — PAYOR COMM NOTE
--------------  ADMISSION REVIEW     Payor: [employer]  Subscriber #:  [id number]  Authorization Number: [id number]    Admit date: 7/28/24  Admit time:  2:29 PM       REVIEW DOCUMENTATION:  ED Provider Notes signed by Shannan Reeder MD at 7/28/2024  3:32 PM      Patient Seen in: [facility]    History     Chief Complaint   Patient presents with    Abdomen/Flank Pain    Chest Pain Angina     Stated Complaint: patient stated \"I have a kidney stone, body aches, muscle pain and fever\"    HPI  54-year-old female was diagnosed 3 days ago with a distal right ureteral stone of 3 mm diameter.  The patient was seen in the emergency department at that time with some right flank pain.  Urinalysis did not indicate evidence of urinary tract infection however the urine culture subsequently grew out E. coli, pansensitive which was received this morning.  The patient did follow-up with Dr. Giuseppe Kirkpatrick on Friday in the office.  She spiked a fever to 103 degrees yesterday and then was febrile to greater than 100 degrees today whereupon she called the urologist and was referred to the emergency department.  No vomiting.  Does acknowledge significant body aches.  She has been nauseated and has vomited.    History reviewed. No pertinent past medical history.    Past Surgical History:   Procedure Laterality Date    Jaw surgery      Removal gallbladder      Tonsillectomy       Review of Systems  Positive for stated Chief Complaint: Abdomen/Flank Pain and Chest Pain Angina  Other systems are as noted in HPI.  Constitutional and vital signs reviewed.    All other systems reviewed and negative except as noted above.    Physical Exam     ED Triage Vitals [07/28/24 1224]   /73   Pulse 106   Resp 20   Temp (!) 100.6 °F (38.1 °C)   Temp src Oral   SpO2 96 %   O2 Device None (Room air)     Vital Signs  BP: 120/67  Pulse: 98  Resp: 17  Temp: (!) 100.6 °F (38.1 °C)  Temp src: Oral    Oxygen Therapy  SpO2: 90 %  O2  Device: None (Room air)  Pulse Oximetry Type: Continuous  Pulse Ox Probe Location: Right hand    Physical Exam  General appearance: This is a middle-aged female sitting on a gurney.  Vital signs were reviewed per nurses notes.  Monitor reveals a sinus rhythm rate in the 90s.  Initial blood pressure was 120/67.  Temperature is 100.6.  Pulse oximetry is 90% on room air.  HEENT: Normocephalic atraumatic.  Anicteric sclera.  Oral mucosa is moist.  Oropharynx is normal.  Neck: No adenopathy or thyromegaly.  Lungs are clear to auscultation.  Heart exam: Normal S1-S2 without extra sounds or murmurs.  Regular rate and rhythm.  Abdomen is soft and nontender without masses or rebound.  Extremities are atraumatic.  Skin is dry without rashes or lesions.  Neuroexam: Alert and oriented x 4.  Speech is fluent.  Moving all 4 extremities well.    ED Course     Labs Reviewed   CBC, PLATELET; NO DIFFERENTIAL - Abnormal; Notable for the following components:       Result Value    WBC 11.7 (*)     All other components within normal limits   COMP METABOLIC PANEL (14) - Abnormal; Notable for the following components:    Glucose 113 (*)     Sodium 135 (*)     AST 75 (*)      (*)     Alkaline Phosphatase 116 (*)     All other components within normal limits   URINALYSIS, ROUTINE - Abnormal; Notable for the following components:    Clarity Urine Turbid (*)     Ketones Urine Trace (*)     Blood Urine Trace (*)     Protein Urine 100 (*)     Nitrite Urine 2+ (*)     Leukocyte Esterase Urine 500 (*)     WBC Urine >50 (*)     RBC Urine 6-10 (*)     Bacteria Urine 1+ (*)     Squamous Epi. Cells Few (*)     All other components within normal limits   CBC W/ DIFFERENTIAL - Abnormal; Notable for the following components:    WBC 11.7 (*)     Neutrophil Absolute Prelim 10.39 (*)     Neutrophil Absolute 10.39 (*)     Lymphocyte Absolute 0.41 (*)     All other components within normal limits   LACTIC ACID, PLASMA - Normal   PROTHROMBIN TIME (PT)  - Normal   PTT, ACTIVATED - Normal   CBC WITH DIFFERENTIAL WITH PLATELET   BLOOD CULTURE   BLOOD CULTURE   URINE CULTURE, ROUTINE     EKG    Rate, intervals and axes as noted on EKG Report.  Rate: 103  Rhythm: Sinus tachycardia  Reading: Possible left atrial enlargement.  Low voltage QRS.  Borderline EKG.  Agree with EKG report.  Other than heart rate there is no significant interval change compared to prior EKG of April of this year.    Intravenous access was obtained.  Acetaminophen was given for fever.  Sepsis protocol was initiated after laboratory studies were drawn.  A 30 mL/kg bolus of normal saline was administered.  Intravenous Zosyn was given.    Case was discussed with [facility] urology on-call.  [facility] Hospitalist was also contacted via [employer] regarding hospitalization.    Patient has been drinking water up until 11 AM but no solid foods today.  She was kept NPO.    Plan is for the patient to go to the operating room for right ureteral stent placement.  Patient was admitted to a medical bed for IV antibiotics with concern for bacteremia.    MDM      #1.  Ureteral lithiasis.  2.  Acute pyelonephritis.  Due to high fevers with the urinary obstruction and presence of urinary tract infection there is concern for bacteremia.  Admission disposition: 7/28/2024  1:15 PM    [facility]   part of [facility]  Sepsis Reassessment Note  /67 (BP Location: Right arm)   Pulse 98   Temp (!) 100.6 °F (38.1 °C) (Oral)   Resp 17   Wt 66.7 kg   SpO2 90%   BMI 26.89 kg/m²      I completed the sepsis reassessment at 1400    Cardiac:  Regularity: Regular  Rate: Normal  Heart Sounds: S1,S2  Lungs:   Right: Clear  Left: Clear  Peripheral Pulses:  Radial: Right 1+ or Left 1+  Capillary Refill:  <3 Secs  Skin:  Temp/Moisture: Warm and Dry  Color: Normal  Shannan Reeder MD  7/28/2024  3:26 PM    MDM  Disposition and Plan     Clinical Impression:  1. Ureterolithiasis    2. Acute pyelonephritis        Disposition:  Admit  7/28/2024  1:15 pm    Hospital Problems       Present on Admission             ICD-10-CM Noted POA    * (Principal) Ureterolithiasis N20.1 7/28/2024 Unknown    Acute pyelonephritis N10 7/28/2024 Unknown       Shannan Reeder MD on 7/28/2024  3:32 PM      History and Physical   Chief Complaint: History of nephrolithiasis fever chills flank pain    History of Present Illness:   54 year old female with history of nephrolithiasis who presents to emergency room to be evaluated for fever chills.  She initially presented to emergency room on July 26 and she was diagnosed with distal right ureteral stone 3 mm in diameter.  Patient follows up with urology as an outpatient.  She states that she spiked fever 103 yesterday and today and she started to have significant body pains and bilateral flanks pain 5 6 out of 10 no radiation and cramping in nature.  She called her urologist and she was advised to come to emergency room to be evaluated.  Her CBC shows mildly elevated WBC with left shift.  AST and ALT are mildly elevated and sodium mildly decreased at 135.  UA shows 2+ nitrates and greater than 50 WBCs.  Urine culture from July 26 shows E. coli pansensitive.      /67 (BP Location: Right arm)  Pulse 95  Temp (!) 102.9 °F (39.4 °C) (Oral)  Resp 17  Wt 147 lb (66.7 kg)  SpO2 90%  BMI 26.89 kg/m²       Lab 07/28/24  1237   RBC 3.80  3.80   HGB 12.2  12.2   HCT 35.7  35.7   MCV 93.9  93.9   MCH 32.1  32.1   MCHC 34.2  34.2   RDW 13.1  13.1   NEPRELIM 10.39*   WBC 11.7*  11.7*   .0  226.0      *   BUN 15   CREATSERUM 0.83   EGFRCR 84   CA 9.3   ALB 4.7   *   K 3.6      CO2 28.0   ALKPHO 116*   AST 75*   *   BILT 0.5   TP 7.4       INR 1.09 07/28/2024     Assessment & Plan:  # 54-year-old female with nephrolithiasis  -Keep n.p.o.  -Dilaudid IV as needed for pain  -Patient states that Toradol gives her palpitations  -Continue antibiotics IV fluids and  urology eval for cystoscopy     # History of GERD    Plan of care discussed with patient and emergency room physician  Michelle Arnold MD  7/28/2024  5:18 PM       UROLOGY CONSULT NOTE   Reason for Consultation:  K stone and fever      History of Present Illness:  54 year old female. Distal small stone on right   Having fevers and pain       /67 (BP Location: Right arm)  Pulse 95  Temp (!) 102.9 °F (39.4 °C) (Oral)  Resp 17  Wt 147 lb (66.7 kg)  SpO2 90%  BMI 26.89 kg/m²       Component Value Date     WBC 11.7 07/28/2024     HGB 12.2 07/28/2024     HCT 35.7 07/28/2024     .0 07/28/2024     CREATSERUM 0.83 07/28/2024     BUN 15 07/28/2024      07/28/2024     K 3.6 07/28/2024      07/28/2024     CO2 28.0 07/28/2024      07/28/2024     CA 9.3 07/28/2024     ALB 4.7 07/28/2024     ALKPHO 116 07/28/2024     BILT 0.5 07/28/2024     TP 7.4 07/28/2024     AST 75 07/28/2024      07/28/2024     PTT 26.5 07/28/2024     INR 1.09 07/28/2024     PTP 14.1 07/28/2024      Component Value Date     COLORUR Yellow 07/28/2024     CLARITY Turbid 07/28/2024     SPECGRAVITY 1.021 07/28/2024     GLUUR Normal 07/28/2024     BILUR Negative 07/28/2024     KETUR Trace 07/28/2024     BLOODURINE Trace 07/28/2024     PHURINE 6.0 07/28/2024     PROUR 100 07/28/2024     UROBILINOGEN Normal 07/28/2024     NITRITE 2+ 07/28/2024     LEUUR 500 07/28/2024     Impression and Plan:   Ureterolithiasis    Acute pyelonephritis       R ureteral stone  R  4mm,  3mm   LEFT   3 mm   UTI and fevers -- E  Coli    Pan Sens      Will plan on  URS o  R - quick. If any issue or delay will just place a JJ stent     UA  - dirty   On  Zosyn --   would change to Ancef  and go Home Keflex or Bactrim    x 10 d  Josep Garner MD   7/28/2024  5:32 PM      Operative Note    Preoperative Diagnosis: Right ureteral stone [N20.1]   Postoperative Diagnosis: Right ureteral stone [N20.1]   Procedure(s):  CYSTOSCOPY, RIGHT RETROGRADE PYELOGRAM,  RIGHT URETERAL STENT PLACEMENT,  URETEROSCOPY RIGHT     Anesthesia: General   Specimen: * No specimens in log *   Estimated Blood Loss: No data recorded  Complications: none       Indications for procedure: Fever and stone   Surgical Findings: Very narrow ureter and not able to get the URS up more then  4-5 cm       Complexity:  (optional)     Operative Summary:     Patient was identified in the operating room table.  She was draped and prepped in usual sterile fashion in lithotomy position.  She underwent a standard timeout procedure received IV Zosyn and Venodyne's within an hour of surgery.  Initially performed rigid cystoscopy and noted a normal right ureteral orifice.  The the UO was not inflamed or red consistent with a passed stone.  We were able to easily cannulate the distal ureter and performed very gentle retrograde urogram noting some narrowness of the ureter approximately 5 cm to 6 cm above the ureteral orifice but no clear filling defect and and very mild hydronephrosis.  Overt open-ended stent we placed a guidewire up into the kidney under fluoroscopy and then we looked with extort [place] ureteroscope up the right ureter we were able to look up about 5 to 6 cm but the ureter was very tight at this level and could not proceed any more proximally secondary to the stenosis.  Of note we performed ureteroscopy under gravity and did not use pressure.  Because of the ureteral stenosis and concern from her previous fevers we decided to abort and place a 6 x 24 double-J stent.  Good curls in the kidney and bladder were noted under fluoroscopy.  We did not leave a string on with the plan that stent may need to stay more than a week or so.  Patient was subsequently transferred to the recovery room in stable condition.    Implants:   Implant Name Type Inv. Item Serial No.  Lot No. LRB No. Used Action   STENT URET [id number] ASCERTA - SNA   STENT URET [id number] ASCERTA NA [employer]  [id number] Right 1 Implanted      Drains: 6  x  24   Condition: Stable    Louie Van Garner MD  7/28/2024  6:09 PM   7/29/2024:  Urology Progress Note   Started having left sided pain this AM. Tmax 102.9 on 7/28, most recent temp 98.1. +chills. +dysuria, hematuria, irritative voiding symptoms.   Patient reports she passed a stone on 7/27   /67 (BP Location: Left arm)  Pulse 58  Temp 98.1 °F (36.7 °C) (Oral)  Resp 18  Ht 5' 2\" (1.575 m)  Wt 147 lb (66.7 kg)  SpO2 94%  BMI 26.89 kg/m²   Component Value Date     CREATSERUM 0.80 07/29/2024     BUN 14 07/29/2024      07/29/2024     K 3.5 07/29/2024      07/29/2024     CO2 28.0 07/29/2024      07/29/2024     CA 9.1 07/29/2024     ALB 4.0 07/29/2024     ALKPHO 139 07/29/2024     BILT 0.4 07/29/2024     TP 6.6 07/29/2024     AST 67 07/29/2024      07/29/2024     1. Blood Culture     Status: Abnormal (Preliminary result)     Collection Time: 07/28/24  1:21 PM     Specimen: Blood,peripheral   Result Value Ref Range     Blood Culture Result Positive N/A     Blood Smear Positive Blood Culture (A) N/A     Blood Smear Gram Negative Rods (A) N/A     There are other intrarenal stones that can also be treated on the day of future URS if patient is interested. More definitive ureteroscopy of the distal ureter can make sure there is no other retrained stone fragment there after patient's report of having just passed a stone but still having obstructive findings noted on URS/stent placement yest   Plan:    Check abdominal/pelvic CT scan without contrast to exclude left ureteral calculus.  NPO until CT scan done.    Check final culture results  Abx per attending  Follow labs, temp  Pain management  Patient to bring stone with her to her follow-up visit with Dr. He.    Patient to follow-up with Dr. He after discharge to discuss next step.  Giuseppe He MD   7/29/2024   4:07 PM     7/30/2024:  Urology Progress Note   +abdominal bloating. Some  nausea. No vomiting, fever. Some chills. +dysuria, irritative voiding symptoms.   1 episode of diarrhea.   Blood pressure 104/56, pulse 68, temperature 98.6 °F (37 °C), temperature source Oral, resp. rate 17, height 5' 2\" (1.575 m), weight 147 lb (66.7 kg), SpO2 98%.   Urine Culture, Routine     Status: Abnormal (Preliminary result)     Collection Time: 07/28/24 12:37 PM     Specimen: Urine, clean catch   Result Value Ref Range     Urine Culture >100,000 CFU/ML Escherichia coli (A) N/A     Plan:    Check final cultures  Abx per attending  Follow labs, temp  Recommend cystoscopy, right RGPG, right ureteroscopy with laser lithotripsy, and ureteral stent exchange in 2 weeks while on abx - 60 minutes. Surgery ticket to be placed. Definitive ureteroscopy of the distal ureter can make sure there is no other retrained stone fragment there after patient's report of having just passed a stone but still having obstructive findings noted on URS/stent placement 7/28/24 with Dr. Garner.    Nurse will contact hospitalist regarding symptoms/large amount of stool on CT. Monitor stool - may need CDIFF ordered.       Above discussed with patient,  nurse, Dr. Giuseppe He.  Caity Pacheco PA-C  7/30/2024  5:32 AM    MEDICATIONS ADMINISTERED:  Medications 07/28/24 07/29/24 07/30/24   enoxaparin (Lovenox) 40 MG/0.4ML SUBQ injection 40 mg  Dose: 40 mg  Freq: Daily Route: SC  Start: 07/29/24 0245   Admin Instructions:   Only administer Enoxaparin subcutaneously into the abdomen unless directed otherwise by a physician. Alternate injection sites between the left and right anterolateral and left and right posterolateral abdominal wall.     (0900 SF)-Not Given [C]   2038 AS-Given       2100          ondansetron (Zofran) 4 MG/2ML injection 4 mg  Dose: 4 mg  Freq: Once Route: IV  Start: 07/28/24 1253 End: 07/28/24 1312    1312 RE-Given            phenazopyridine (Pyridium) tab 100 mg  Dose: 100 mg  Freq: 3 times daily with meals Route: OR  Start:  07/29/24 2015 End: 07/31/24 1659 2038 AS-Given        (0800 SF)-Not Given   (1200 SF)-Not Given   1700        piperacillin-tazobactam (Zosyn) 3.375 g in dextrose 5% 100 mL IVPB-ADDV  Dose: 3.375 g  Freq: Every 8 hours Route: IV  Last Dose: 3.375 g (07/30/24 0847)  Start: 07/28/24 1715   Admin Instructions:   Incompatible with Lactated Ringers (LR)   Order specific questions:       1805 BP-New Bag        0110 BW-New Bag   0817 SF-New Bag   1618 SF-New Bag      0154 MK-New Bag   0847 SF-New Bag   1715        piperacillin-tazobactam (Zosyn) 4.5 g in dextrose 5% 100 mL IVPB-ADDV  Dose: 4.5 g  Freq: Once Route: IV  Last Dose: Stopped (07/28/24 1353)  Start: 07/28/24 1243 End: 07/28/24 1353   Admin Instructions:   Incompatible with Lactated Ringers (LR)   Order specific questions:       1318 RE-New Bag   1353 RE-Stopped             lactated ringers infusion  Rate: 100 mL/hr  Freq: Continuous Route: IV  Start: 07/28/24 1830 End: 07/28/24 1920 1830 MB-Rate/Dose Change   1920-D/C'd         sodium chloride 0.45% infusion  Rate: 125 mL/hr  Freq: Continuous Route: IV  Start: 07/28/24 1930 2011 BW-New Bag        1432 SF-New Bag           sodium chloride 0.9 % IV bolus 2,001 mL  Rate: 667 mL/hr Dose: 30 mL/kg  Weight Dosing Info: 66.7 kg  Freq: Continuous Route: IV  Start: 07/28/24 1243 End: 07/28/24 1611    1312 RE-New Bag   1353 RE-Rate/Dose Verify   1400 RE-Rate/Dose Verify     1611-D/C'd            HYDROmorphone (Dilaudid) 1 MG/ML injection 0.5 mg  Dose: 0.5 mg  Freq: Every 30 min PRN Route: IV  PRN Reason: severe pain  Start: 07/28/24 1309 End: 07/28/24 1446    1326 RE-Given   1446-D/C'd         iohexol (Omnipaque) 300 MG/ML injection  Freq: As needed  Start: 07/28/24 1821 End: 07/28/24 1831    1821 SB-Given   1831-D/C'd         ondansetron (Zofran) 4 MG/2ML injection 4 mg  Dose: 4 mg  Freq: Every 6 hours PRN Route: IV  PRN Reasons: Nausea,vomiting  Start: 07/28/24 1430   Admin Instructions:   Default antiemetic  sequence (unless otherwise preferred by patient):  1. ondansetron (Zofran)  2. prochlorperazine (Compazine). Wait 15 minutes before proceeding to next medication in sequence.  Follow therapeutic duplication policy.    1707 UE-MAR Hold   1923 BW-MAR Unhold        0623 MK-Given          oxybutynin (Ditropan) tab 5 mg  Dose: 5 mg  Freq: 4 times daily PRN Route: OR  PRN Reason: bladder spasms  Start: 07/28/24 1923   Admin Instructions:   Bladder spasms, Stent discomfort    2009 BW-Given        0110 BW-Given   1518 SF-Given          prochlorperazine (Compazine) 10 MG/2ML injection 5 mg  Dose: 5 mg  Freq: Every 8 hours PRN Route: IV  PRN Reasons: Nausea,vomiting  Start: 07/28/24 1430   Admin Instructions:   Default antiemetic sequence (unless otherwise preferred by patient):  1. ondansetron (Zofran) 2. prochlorperazine (Compazine). Wait 15 minutes before proceeding to next medication in sequence.  Follow therapeutic duplication policy.    1707 UE-MAR Hold   1923 BW-MAR Unhold        1013 SF-Given            Vitals (last day)       Date/Time Temp Pulse Resp BP SpO2 Weight O2 Device O2 Flow Rate (L/min) Who    07/30/24 1255 99.2 °F (37.3 °C) 66 18 111/70 98 % -- None (Room air) -- NW 07/30/24 0547 98.8 °F (37.1 °C) 60 18 123/71 95 % -- None (Room air) 0 L/min HB    07/29/24 1710 98.6 °F (37 °C) 68 17 104/56 98 % -- None (Room air) -- NW 07/29/24 1220 98.6 °F (37 °C) 63 16 114/62 99 % -- None (Room air) -- NW 07/29/24 0800 99 °F (37.2 °C) 59 18 106/57 95 % -- None (Room air) --     07/29/24 0500 -- 58 18 109/67 94 % -- None (Room air) --     07/29/24 0459 -- 60 18 107/66 95 % -- None (Room air) --     07/29/24 0404 98.1 °F (36.7 °C) 51 14 75/41 98 % -- None (Room air) -- GL    07/29/24 0014 97.8 °F (36.6 °C) 56 18 107/54 97 % -- None (Room air) -- BW     07/28/24 1951 98.7 °F (37.1 °C) 78 16 100/62 94 % 147 lb (66.7 kg) None (Room air) -- BW   07/28/24 1915 -- 93 18 -- 95 % -- -- -- MB   07/28/24 1910 -- 100  26 93/66 97 % -- -- -- MB   07/28/24 1905 -- 98 20 -- 94 % -- -- -- MB   07/28/24 1900 100.4 °F (38 °C) 103 25 104/59 95 % -- -- -- MB   07/28/24 1855 -- 105 22 90/63 95 % -- -- -- MB   07/28/24 1850 -- 98 21 99/52 94 % -- -- -- MB   07/28/24 1845 -- 102 19 98/58 93 % -- -- -- MB   07/28/24 1840 102.1 °F (38.9 °C) Abnormal  101 18 99/56 94 % -- -- -- MB   07/28/24 1835 -- 104 23 97/54 95 % -- -- -- MB   07/28/24 1830 -- 109 8 Abnormal  91/50 96 % -- -- -- MB   07/28/24 1829 102 °F (38.9 °C) Abnormal  109 18 91/50 96 % -- None (Room air) -- MB   07/28/24 1704 102.9 °F (39.4 °C) Abnormal  -- -- -- -- -- -- -- DL   07/28/24 1422 100.6 °F (38.1 °C) Abnormal  98 17 120/67 90 % -- None (Room air) -- PG   07/28/24 1224 100.6 °F (38.1 °C) Abnormal  106 20 120/73 96 % 147 lb (66.7 kg) None (Room air) -- LP       FOR REVIEW/APPROVAL OF INPT ADMISSION

## 2024-07-30 NOTE — PROGRESS NOTES
[facility]   part of [facility]     Hospitalist Progress Note     Thais Woodward Patient Status:  Inpatient    1970 MRN [id number]   Location [facility] 3NW-A Attending Michelle Arnold MD   Hosp Day # 2 PCP Garrison Charles MD     Chief Complaint: kidney stone, bacteremia    Subjective:     Does not feel well today, a lot of abdominal bloating and diarrhea,     Objective:    Review of Systems:   A comprehensive review of systems was completed; pertinent positive and negatives stated in subjective.    Vital signs:  Temp:  [98.6 °F (37 °C)-99.2 °F (37.3 °C)] 99.2 °F (37.3 °C)  Pulse:  [60-68] 66  Resp:  [17-18] 18  BP: (104-123)/(56-71) 111/70  SpO2:  [95 %-98 %] 98 %    Physical Exam:    General: No acute distress  Respiratory: No wheezes, no rhonchi  Cardiovascular: S1, S2, regular rate and rhythm  Abdomen: Soft, Non-tender, non-distended, positive bowel sounds  Neuro: No new focal deficits.   Extremities: No edema      Diagnostic Data:    Labs:  Recent Labs   Lab 24  1237 24  1321 24  0552   WBC 11.7*  11.7*  --  11.6*   HGB 12.2  12.2  --  10.3*   MCV 93.9  93.9  --  93.9   .0  226.0  --  239.0   INR  --  1.09  --        Recent Labs   Lab 24  1237 24  0513 24  1355 24  0552   *  --  112* 106*   BUN 15  --  14 18   CREATSERUM 0.83  --  0.80 0.77   CA 9.3  --  9.1 9.2   ALB 4.7  --  4.0 3.6   *  --  138 139   K 3.6 3.7 3.5 3.2*  3.2*     --  106 107   CO2 28.0  --  28.0 27.0   ALKPHO 116*  --  139* 120*   AST 75*  --  67* 38*   *  --  141* 105*   BILT 0.5  --  0.4 0.3   TP 7.4  --  6.6 6.2       Estimated Creatinine Clearance: 66.1 mL/min (based on SCr of 0.77 mg/dL).    No results for input(s): \"TROP\", \"TROPHS\", \"CK\" in the last 168 hours.    Recent Labs   Lab 24  1321   PTP 14.1   INR 1.09            Microbiology    Hospital Encounter on 24   1. Blood Culture     Status: Abnormal (Preliminary result)     Collection Time: 07/28/24  1:21 PM    Specimen: Blood,peripheral   Result Value Ref Range    Blood Culture Result Positive N/A    Blood Culture Result Escherichia coli (A) N/A    Blood Smear Positive Blood Culture (A) N/A    Blood Smear Gram Negative Rods (A) N/A   2. Urine Culture, Routine     Status: Abnormal    Collection Time: 07/28/24 12:37 PM    Specimen: Urine, clean catch   Result Value Ref Range    Urine Culture >100,000 CFU/ML Escherichia coli (A) N/A       Susceptibility    Escherichia coli -  (no method available)     Ampicillin <=2 Sensitive      Cefazolin <=4 Sensitive      Ciprofloxacin <=0.25 Sensitive      Gentamicin <=1 Sensitive      Meropenem <=0.25 Sensitive      Levofloxacin <=0.12 Sensitive      Nitrofurantoin <=16 Sensitive      Piperacillin + Tazobactam <=4 Sensitive      Trimethoprim/Sulfa <=20 Sensitive          Imaging: Reviewed in Epic.    Medications:    potassium chloride  40 mEq Oral Once    phenazopyridine  100 mg Oral TID CC    acetaminophen  1,000 mg Oral Once    potassium chloride  40 mEq Intravenous Once    piperacillin-tazobactam  3.375 g Intravenous Q8H    enoxaparin  40 mg Subcutaneous Daily       Assessment & Plan:      #Right ureteral stone  -s/p cysto and stent  -having left sided flank pain today,  CT reviewed and negative for acute changes    #UTI  #E.coli bacteremia  #Sepsis- due to above ( fever, hypotension, UTI)  -IVF  -follow final Cx results  -continue Abx  -afebrile    #Elevated LFTs  -could be related to sepsis   -stable  -CT showing liver cysts, no biliary dilatation  -RUQ US    Elizabeth Parmar MD    Supplementary Documentation:     Quality:  DVT Mechanical Prophylaxis:   SCDs, Early ambuation  DVT Pharmacologic Prophylaxis   Medication    enoxaparin (Lovenox) 40 MG/0.4ML SUBQ injection 40 mg                Code Status: Not on file  Lee: No urinary catheter in place  Lee Duration (in days):   Central line:    CLAU: 7/30/2024    Discharge is dependent on:  progress  At this point Ms. Woodward is expected to be discharge to: home    The 21st Century Cures Act makes medical notes like these available to patients in the interest of transparency. Please be advised this is a medical document. Medical documents are intended to carry relevant information, facts as evident, and the clinical opinion of the practitioner. The medical note is intended as peer to peer communication and may appear blunt or direct. It is written in medical language and may contain abbreviations or verbiage that are unfamiliar.

## 2024-07-30 NOTE — PROGRESS NOTES
[facility]  Urology Progress Note    Thais Woodward Patient Status:  Inpatient    1970 MRN [id number]   Location [facility] 3NW-A Attending Elizabeth Parmar MD   Hosp Day # 2 PCP Garrison Charles MD     Subjective:  Thais Woodward is a(n) 54 year old female.    S/P cystoscopy, right RGPG, right ureteral stent placement, ureteroscopy right 24 with Dr. Garner.      Current complaints: +abdominal bloating.  Some nausea.  No vomiting, fever. Some chills.  +dysuria, irritative voiding symptoms.      1 episode of diarrhea.    Objective:  General appearance: alert, appears stated age, and cooperative  Blood pressure 104/56, pulse 68, temperature 98.6 °F (37 °C), temperature source Oral, resp. rate 17, height 5' 2\" (1.575 m), weight 147 lb (66.7 kg), SpO2 98%.  Lungs: non-labored respirations  Abdomen: soft, non-tender    Lab Results   Component Value Date    CREATSERUM 0.80 2024    BUN 14 2024     2024    K 3.5 2024     2024    CO2 28.0 2024     2024    CA 9.1 2024    ALB 4.0 2024    ALKPHO 139 2024    BILT 0.4 2024    TP 6.6 2024    AST 67 2024     2024       Hospital Encounter on 24   1. Blood Culture     Status: Abnormal (Preliminary result)    Collection Time: 24  1:21 PM    Specimen: Blood,peripheral   Result Value Ref Range    Blood Culture Result Positive N/A    Blood Smear Positive Blood Culture (A) N/A    Blood Smear Gram Negative Rods (A) N/A   2. Urine Culture, Routine     Status: Abnormal (Preliminary result)    Collection Time: 24 12:37 PM    Specimen: Urine, clean catch   Result Value Ref Range    Urine Culture >100,000 CFU/ML Escherichia coli (A) N/A        CT ABDOMEN+PELVIS KIDNEYSTONE 2D RNDR(NO IV,NO ORAL)(CPT=74176)    Result Date: 2024  CONCLUSION:  A right ureteral stent has been placed since prior CT from 2024.  There is mild to moderate  perinephric stranding and trace fluid about the right kidney.  Bilateral nephrolithiasis.  Left kidney is negative for hydronephrosis.  There is new free fluid in the pelvis in the cul de sac and superior to the dome of the urinary bladder.  Circumferential wall thickening of the urinary bladder.  Recommend clinical correlation to exclude cystitis/pyelonephritis.  Fibroid uterus.    LOCATION:  [id number]   Dictated by ([facility]): Vanessa Castillo MD on 7/29/2024 at 9:52 AM     Finalized by ([facility]): Vanessa Castillo MD on 7/29/2024 at 9:58 AM       XR OR - N/C    Result Date: 7/28/2024  CONCLUSION:  There has been interval placement of a right ureteral stent.  The visualized portion of the stent appears normally located.  The proximal portion of the stent is not included in the field of view.  Correlate with urologist's operative report.   LOCATION:  [place]    Dictated by ([facility]): Tenzin Gray MD on 7/28/2024 at 9:23 PM     Finalized by ([facility]): Tenzin Gray MD on 7/28/2024 at 9:25 PM         Assessment:    FEVER, UTI/PYELO, BACTEREMIA, RIGHT URETERAL CALCULUS, BILATERAL UROLITHIASIS  S/P cystoscopy, right RGPG, right ureteral stent placement, ureteroscopy right 7/28/24 with Dr. Garner.    Findings: The the UO was not inflamed or red consistent with a passed stone. We were able to easily cannulate the distal ureter and performed very gentle retrograde urogram noting some narrowness of the ureter approximately 5 cm to 6 cm above the ureteral orifice but no clear filling defect and and very mild hydronephrosis. Overt open-ended stent we placed a guidewire up into the kidney under fluoroscopy and then we looked with extort Yudelka ureteroscope up the right ureter we were able to look up about 5 to 6 cm but the ureter was very tight at this level and could not proceed any more proximally secondary to the stenosis. Of note we performed ureteroscopy under gravity and did not use pressure. Because of the ureteral stenosis and  concern from her previous fevers we decided to abort and place a 6 x 24 double-J stent.   Tmax 102.9 on 7/28, afebrile 7/29  WBC 11.7  Serum creat 0.83 > 0.8  Serum calcium level 9.3 > 9.1  Urine culture 7/26/24: >100K CFU/mL: E. Coli  Urine culture 7/28/24: prelim >100K CFU/mL E. coli  2/2 Blood culture 7/28/24: prelim GNR  Blood culture PCR 7/28/24: E. Coli by PCR detected, CTX-M (ESBL gene) by PCR not detected  CT abdomen/pelvis without contrast 7/29/24: A right ureteral stent has been placed when compared to 7/26/2024 CT.  There is perinephric stranding with trace free fluid surrounding the right kidney.  No hydronephrosis.  There is a 4 mm calcification in the midpole of the right kidney. There is a 3 mm calcification midpole left kidney and 1 mm calcification mid to inferior pole left kidney.  Left kidney is negative for hydronephrosis.  No CT evidence for obstructing calculus.       Plan:    Check final cultures  Abx per attending  Follow labs, temp  Recommend cystoscopy, right RGPG, right ureteroscopy with laser lithotripsy, and ureteral stent exchange in 2 weeks while on abx - 60 minutes. Surgery ticket to be placed. Definitive ureteroscopy of the distal ureter can make sure there is no other retrained stone fragment there after patient's report of having just passed a stone but still having obstructive findings noted on URS/stent placement 7/28/24 with Dr. Garner.    Nurse will contact hospitalist regarding symptoms/large amount of stool on CT. Monitor stool - may need CDIFF ordered.      Above discussed with patient,  nurse, Dr. Giuseppe He.    Caity Pacheco PA-C  [facility]  Department of Urology  7/30/2024  5:32 AM

## 2024-07-30 NOTE — PROGRESS NOTES
A&Ox4. VSS. RA.   GI: Abdomen soft, nondistended. Passing gas.  Denies nausea.  : Voids, c/o burning w/ urination and urgency- MD aware, Pyridium order placed. Straining urine.   Up independently   Diet: Tolerating regular diet   IVF running per order.  All appropriate safety measures in place. All questions and concerns addressed.

## 2024-07-30 NOTE — DISCHARGE INSTRUCTIONS
Having a Ureteral Stent  A ureteral stent is a soft plastic tube with holes in it. It’s temporarily inserted into a ureter to help drain urine into the bladder. One end goes in the kidney. The other end goes in the bladder. A coil on each end holds the stent in place. The stent can’t be seen from outside the body. It shouldn’t interfere with your normal routine. Your stent will be put in by a doctor trained in treating the urinary tract (a urologist) or another specialist. The procedure is done in a hospital or surgery center. You’ll likely go home the same day.   When is a ureteral stent used?  A ureteral stent may be used:  To bypass a blockage in a kidney or ureter.  During kidney stone removal.  To let a ureter heal after surgery.    Before the procedure  Your healthcare provider will give you instructions to prepare for the procedure. X-rays or other imaging tests of your kidneys and ureters may be done beforehand.   During the procedure  You receive medicine to prevent pain and help you relax or sleep during the procedure. Once this takes effect, the procedure starts.  The doctor inserts a cystoscope (lighted instrument) through the urethra and into the bladder. This shows the opening to the ureter.  A thin wire is carefully threaded through the cystoscope, up the ureter, and into the kidney. The stent is inserted over the wire.  A fluoroscope (special X-ray machine) is used to help position the stent. When the stent is in place, the wire and cystoscope are removed.     While you have a stent  Some discomfort is normal. Certain movements may trigger pain or a feeling that you need to urinate. You may also feel mild soreness or pressure before or during urination. These symptoms will go away a few days after the stent is removed.  Medicine to control pain or bladder spasms or to prevent infection may be prescribed. Take this as directed.  Drink plenty of fluids to help flush out your urinary tract.  Your urine  may be slightly pink or red. This is due to bleeding caused by minor irritation from the stent. This may happen on and off while you have the stent.  As with any synthetic device placed in the body, there is a risk of infection. The stent may have to be removed if this happens.      How long will you need a stent?  The stent is often taken out after the blockage in the ureter is treated or the ureter has healed. This may take 1 week to 2 weeks, or longer. If a stent is needed for a long time, it may need to be changed every few months.   When to call your healthcare provider  Contact your healthcare provider right away if:  Your urine contains blood clots or you see a large amount of blood-tinged urine  You have symptoms similar to those you had before the stent was placed  You constantly leak urine  You have a fever over 100.4°F (38°C), or as advised by your health care provider  You have chills  You experience nausea or vomiting  Your pain is not relieved with medicine  The end of the stent comes out of the urethra  You experience new or worsening symptoms  [employer] last reviewed this educational content on 4/1/2020 © 2000-2020 [employer]. [address] [place] [address]. All rights reserved. This information is not intended as a substitute for professional medical care. Always follow your healthcare professional's instructions.  ------------  Doctor is recommending a surgery called a URETEROSCOPY    You'll receive a call from our surgery schedulers to set up a date, time and location and review what testing or appointments you may need prior to surgery.  If you do not hear from someone in the next day or two, please call [phone] and ask to speak to a surgery scheduler.    REMAIN ON ANTIBIOTICS THROUGH PROCEDURE.     Please write in the date, time and location of your surgery:  ___________________________________________________________________________  What is this surgery?  A  Ureteroscopy (URS) is surgical procedure which allows physicians to examine the ureter (the tube leading from the kidney to the bladder).     What is the purpose?  The URS may be done to evaluate the cause of hematuria, recurrent urinary tract infections, atypical urine results, or acute hydronephrosis. This procedure is often used to remove stones that have become lodged in the ureter or kidney. It is helpful in diagnosing ureteral cancer or a ureteral stricture (abnormal narrowing of the ureter).    How It Is Done     You will be placed under general anesthesia. The physician will pass an ureteroscope (an optical instrument used to view the ureter) through your urethra and into the bladder. The scope is then directed up the ureter. From within the ureter, the physician will have the option to take a biopsy or perform a ureteral washing, place a stent (a small, short tube of flexible plastic mesh), extract a stone, or laser a stone into tiny particles. A ureteral stent is placed in order to hold the ureter open; this may be indicated for a patient with a ureteral stricture or a patient with stones. If you have a stone that is too big to be retrieved on its own, a small fiber (either laser, ultrasonic, or electohyraulic) is passed through the ureteroscope to break it up into tiny particles. The physician can then either retrieve the fragments with a basket or, if they are small enough, let them pass on their own after the procedures is completed.  The procedure takes approximately one hour and you will be allowed to go home the same day.    Risks Associated with Procedure  Injury to the ureter  Urinary tract infection  Bleeding  Abdominal pain    What to Expect After Surgery  You may experience some discomfort, especially if you have particles passing through the ureter. This may be quite intense, but sufficient pain relieving medications will be given to you. You may also experience some blood in your urine or  burning with urination. If you have been prescribed anti-spasmodic medication (i.e. Prosed, Urelle, or Pyridium) you may take them in order to relieve some of the burning sensation while urinating. These medications may discolor your urine.     A stent, may cause urinary frequency, urgency, burning with urination and pain in kidney while urinating.    What To Do After Your Procedure  It is recommended to start with a light meal and advance as tolerated.  Drink plenty of fluids, 10-12 (8 oz) glasses of clear liquids per day, unless given specific restrictions.  Avoid straining and constipation. You can prevent constipation by drinking fluids and adding fruit and vegetables to your diet. You may also add stool softners as needed to prevent constipation.  Take your pain medications as prescribed, when needed. These medications may cause constipation.  If prescribed additional medications (i.e. Rapaflo, Flomax, or Uroxatral) take as directed. Finish/discard antibiotic prescription given prior to lithotripsy.  You may resume any prescription medications, but DO NOT take any aspirin, ibuprofen or blood thinners until approved by your physician.  If you had a stone that was broken into tiny particles, strain your urine as directed and save sand particles. For best collection results, allow strainer to dry before attempting to collect specimen.     Activity Restrictions  For the 24 hours following your procedure DO NOT:  Drive a vehicle  Make any important decisions  Operative any machinery or electrical appliances  Drink alcoholic beverages  Take any tranquilizers or sleeping pills unless approved by your physician  You may resume normal activities as tolerated. However, avoid sports or really strenuous exercise until there is no blood in your urine, or when physician approves.  When to Call Your Doctor  You cannot pass urine.  Your urine becomes so bloody that you cannot see through it.  Your fever is over 100.5° F  (orally) for two readings taken 4 hours apart.  You have severe burning, pain, and irritation with urination.  Your pain is unrelieved by pain medication.  You have persistent nausea and vomiting.    Results  Any pathology results will be reviewed at your first post-op appointment with your physician.    Follow-Up Care:  Please schedule your follow up appointments as soon as you get home your surgery.    *Date of post-op appointment (1-2 weeks after procedure):    _____________________________________________________________________________  *You will be instructed upon discharge when to follow-up with your physician. If you have a stent placed you will be given instructions when it is to be removed. In some cases you will be able to remove the stent yourself at home or you will be required to schedule an in-office procedure in which the stent will be removed. At the time of your follow-up appointment, an x-ray may be performed in order to evaluate the success of the procedure; please arrive 10-15 minutes early in order to complete the x-ray before seeing your physician.  Note: Some stents have a thread/string that may be seen coming from the urethra. DO NOT pull or dislodge the string. If you notice increased leakage of urine, notify your urologist  If you have passed a stone and are able to preserve the specimen, please bring the specimen to your appointment so that a stone analysis may be completed. After your results are received we will inform you of diet prevention strategies that will aide in preventing future stone formation.    Thank you for letting us be involved in your healthcare.

## 2024-07-30 NOTE — PLAN OF CARE
Pt here from: Home with .   Neuro: A&Ox4, VSS, RA, IS. Denies cough, chest pain, and SOB.   GI: Abdomen soft, passing gas and belching. Denies nausea at this time but did have Zofran this morning. Last BM was loose, waiting on stool sample for r/o c.diff.   : Voids freely in bathroom.   Pt declines pain meds, c/o \"achy feeling\". Heat pack given to help bowels move.   Up ad hermes in room and hallway. Pt ambulating in hallway after education on walking can help her bloating.   Diet: Tolerating regular diet.   IVF running per order through PIV R AC.   All appropriate safety measures in place. All questions and concerns addressed. Bed locked and in lowest position. Call light in reach.

## 2024-07-31 ENCOUNTER — APPOINTMENT (OUTPATIENT)
Dept: ULTRASOUND IMAGING | Facility: HOSPITAL | Age: 54
End: 2024-07-31
Attending: HOSPITALIST
Payer: COMMERCIAL

## 2024-07-31 VITALS
WEIGHT: 147 LBS | TEMPERATURE: 99 F | DIASTOLIC BLOOD PRESSURE: 72 MMHG | BODY MASS INDEX: 27.05 KG/M2 | RESPIRATION RATE: 18 BRPM | OXYGEN SATURATION: 94 % | HEART RATE: 74 BPM | SYSTOLIC BLOOD PRESSURE: 141 MMHG | HEIGHT: 62 IN

## 2024-07-31 LAB
ALBUMIN SERPL-MCNC: 3.6 G/DL (ref 3.2–4.8)
ALBUMIN/GLOB SERPL: 1.4 {RATIO} (ref 1–2)
ALP LIVER SERPL-CCNC: 134 U/L
ALT SERPL-CCNC: 117 U/L
ANION GAP SERPL CALC-SCNC: 5 MMOL/L (ref 0–18)
AST SERPL-CCNC: 43 U/L (ref ?–34)
BASOPHILS # BLD AUTO: 0.02 X10(3) UL (ref 0–0.2)
BASOPHILS NFR BLD AUTO: 0.3 %
BILIRUB SERPL-MCNC: 0.4 MG/DL (ref 0.3–1.2)
BUN BLD-MCNC: 12 MG/DL (ref 9–23)
C DIFF TOX B STL QL: NEGATIVE
CALCIUM BLD-MCNC: 9.2 MG/DL (ref 8.7–10.4)
CHLORIDE SERPL-SCNC: 106 MMOL/L (ref 98–112)
CO2 SERPL-SCNC: 29 MMOL/L (ref 21–32)
CREAT BLD-MCNC: 0.72 MG/DL
EGFRCR SERPLBLD CKD-EPI 2021: 99 ML/MIN/1.73M2 (ref 60–?)
EOSINOPHIL # BLD AUTO: 0.07 X10(3) UL (ref 0–0.7)
EOSINOPHIL NFR BLD AUTO: 1 %
ERYTHROCYTE [DISTWIDTH] IN BLOOD BY AUTOMATED COUNT: 13.5 %
GLOBULIN PLAS-MCNC: 2.5 G/DL (ref 2–3.5)
GLUCOSE BLD-MCNC: 91 MG/DL (ref 70–99)
HCT VFR BLD AUTO: 29.3 %
HGB BLD-MCNC: 10 G/DL
IMM GRANULOCYTES # BLD AUTO: 0.04 X10(3) UL (ref 0–1)
IMM GRANULOCYTES NFR BLD: 0.6 %
LYMPHOCYTES # BLD AUTO: 1.82 X10(3) UL (ref 1–4)
LYMPHOCYTES NFR BLD AUTO: 26.6 %
MCH RBC QN AUTO: 31.9 PG (ref 26–34)
MCHC RBC AUTO-ENTMCNC: 34.1 G/DL (ref 31–37)
MCV RBC AUTO: 93.6 FL
MONOCYTES # BLD AUTO: 0.8 X10(3) UL (ref 0.1–1)
MONOCYTES NFR BLD AUTO: 11.7 %
NEUTROPHILS # BLD AUTO: 4.08 X10 (3) UL (ref 1.5–7.7)
NEUTROPHILS # BLD AUTO: 4.08 X10(3) UL (ref 1.5–7.7)
NEUTROPHILS NFR BLD AUTO: 59.8 %
OSMOLALITY SERPL CALC.SUM OF ELEC: 289 MOSM/KG (ref 275–295)
PLATELET # BLD AUTO: 266 10(3)UL (ref 150–450)
POTASSIUM SERPL-SCNC: 3.4 MMOL/L (ref 3.5–5.1)
PROT SERPL-MCNC: 6.1 G/DL (ref 5.7–8.2)
RBC # BLD AUTO: 3.13 X10(6)UL
SODIUM SERPL-SCNC: 140 MMOL/L (ref 136–145)
WBC # BLD AUTO: 6.8 X10(3) UL (ref 4–11)

## 2024-07-31 PROCEDURE — 99239 HOSP IP/OBS DSCHRG MGMT >30: CPT | Performed by: HOSPITALIST

## 2024-07-31 PROCEDURE — 76705 ECHO EXAM OF ABDOMEN: CPT | Performed by: HOSPITALIST

## 2024-07-31 RX ORDER — CIPROFLOXACIN 2 MG/ML
400 INJECTION, SOLUTION INTRAVENOUS EVERY 12 HOURS
Status: DISCONTINUED | OUTPATIENT
Start: 2024-07-31 | End: 2024-07-31

## 2024-07-31 RX ORDER — CIPROFLOXACIN 500 MG/1
500 TABLET, FILM COATED ORAL 2 TIMES DAILY
Qty: 14 TABLET | Refills: 0 | Status: SHIPPED | OUTPATIENT
Start: 2024-07-31 | End: 2024-08-07

## 2024-07-31 RX ORDER — POTASSIUM CHLORIDE 20 MEQ/1
40 TABLET, EXTENDED RELEASE ORAL EVERY 4 HOURS
Status: COMPLETED | OUTPATIENT
Start: 2024-07-31 | End: 2024-07-31

## 2024-07-31 RX ORDER — OXYBUTYNIN CHLORIDE 5 MG/1
5 TABLET ORAL 4 TIMES DAILY PRN
Qty: 30 TABLET | Refills: 0 | Status: SHIPPED | OUTPATIENT
Start: 2024-07-31

## 2024-07-31 RX ORDER — CIPROFLOXACIN 500 MG/1
500 TABLET, FILM COATED ORAL 2 TIMES DAILY
Qty: 14 TABLET | Refills: 0 | Status: SHIPPED | OUTPATIENT
Start: 2024-07-31 | End: 2024-07-31

## 2024-07-31 RX ORDER — OXYBUTYNIN CHLORIDE 5 MG/1
5 TABLET ORAL 4 TIMES DAILY PRN
Qty: 30 TABLET | Refills: 0 | Status: SHIPPED | OUTPATIENT
Start: 2024-07-31 | End: 2024-07-31

## 2024-07-31 NOTE — PLAN OF CARE
Aox4 VSS. RA. Denies chest pain and SOB, n/v.   GI: Abdomen soft, nondistended. Passing gas  : Voids freely in adequate amounts.  Awaiting stool sample for r/out cdiff  Patient denies pain at this time  Ambulating independently in halls  Diet: tolerating diet, npo at 0000 for us  IVF running per order.   Awaiting finalized blood cultures.    All appropriate safety measures in place. All questions and concerns addressed

## 2024-07-31 NOTE — PROGRESS NOTES
[facility]   part of [facility]     Hospitalist Progress Note     Thais Woodward Patient Status:  Inpatient    1970 MRN [id number]   Location [facility] 3NW-A Attending Michelle Arnold MD   Hosp Day # 3 PCP Garrison Charles MD     Chief Complaint: kidney stone, bacteremia    Subjective:     Feels well, no complaints, tolerating gidet    Objective:    Review of Systems:   A comprehensive review of systems was completed; pertinent positive and negatives stated in subjective.    Vital signs:  Temp:  [98.1 °F (36.7 °C)-99.2 °F (37.3 °C)] 99.2 °F (37.3 °C)  Pulse:  [63-75] 63  Resp:  [18] 18  BP: (101-126)/(58-70) 126/70  SpO2:  [93 %-98 %] 94 %    Physical Exam:    General: No acute distress  Respiratory: No wheezes, no rhonchi  Cardiovascular: S1, S2, regular rate and rhythm  Abdomen: Soft, Non-tender, non-distended, positive bowel sounds  Neuro: No new focal deficits.   Extremities: No edema      Diagnostic Data:    Labs:  Recent Labs   Lab 24  1237 24  1321 24  0552 24  0631   WBC 11.7*  11.7*  --  11.6* 6.8   HGB 12.2  12.2  --  10.3* 10.0*   MCV 93.9  93.9  --  93.9 93.6   .0  226.0  --  239.0 266.0   INR  --  1.09  --   --        Recent Labs   Lab 24  1355 24  0552 24  0631   * 106* 91   BUN 14 18 12   CREATSERUM 0.80 0.77 0.72   CA 9.1 9.2 9.2   ALB 4.0 3.6 3.6    139 140   K 3.5 3.2*  3.2* 3.4*    107 106   CO2 28.0 27.0 29.0   ALKPHO 139* 120* 134*   AST 67* 38* 43*   * 105* 117*   BILT 0.4 0.3 0.4   TP 6.6 6.2 6.1       Estimated Creatinine Clearance: 70.6 mL/min (based on SCr of 0.72 mg/dL).    No results for input(s): \"TROP\", \"TROPHS\", \"CK\" in the last 168 hours.    Recent Labs   Lab 24  1321   PTP 14.1   INR 1.09            Microbiology    Hospital Encounter on 24   1. Blood Culture     Status: Abnormal (Preliminary result)    Collection Time: 24  1:21 PM    Specimen: Blood,peripheral    Result Value Ref Range    Blood Culture Result Positive N/A    Blood Culture Result Escherichia coli (A) N/A    Blood Smear Positive Blood Culture (A) N/A    Blood Smear Gram Negative Rods (A) N/A   2. Urine Culture, Routine     Status: Abnormal    Collection Time: 07/28/24 12:37 PM    Specimen: Urine, clean catch   Result Value Ref Range    Urine Culture >100,000 CFU/ML Escherichia coli (A) N/A       Susceptibility    Escherichia coli -  (no method available)     Ampicillin <=2 Sensitive      Cefazolin <=4 Sensitive      Ciprofloxacin <=0.25 Sensitive      Gentamicin <=1 Sensitive      Meropenem <=0.25 Sensitive      Levofloxacin <=0.12 Sensitive      Nitrofurantoin <=16 Sensitive      Piperacillin + Tazobactam <=4 Sensitive      Trimethoprim/Sulfa <=20 Sensitive          Imaging: Reviewed in Epic.    Medications:    ciprofloxacin  400 mg Intravenous Q12H    potassium chloride  40 mEq Oral Q4H    potassium chloride  40 mEq Oral Once    phenazopyridine  100 mg Oral TID CC    acetaminophen  1,000 mg Oral Once    potassium chloride  40 mEq Intravenous Once    enoxaparin  40 mg Subcutaneous Daily       Assessment & Plan:      #Right ureteral stone  -s/p cysto and stent    #UTI  #E.coli bacteremia  #Sepsis- due to above ( fever, hypotension, UTI)  -IVF  -continue Abx  -afebrile  -transition to oral Abx on discharge to complete course    #Elevated LFTs  -could be related to sepsis   -stable  -CT showing liver cysts, no biliary dilatation  -RUQ US negative for acute disease    Dc home today      Elizabeth Parmar MD    Supplementary Documentation:     Quality:  DVT Mechanical Prophylaxis:   SCDs, Early ambuation  DVT Pharmacologic Prophylaxis   Medication    enoxaparin (Lovenox) 40 MG/0.4ML SUBQ injection 40 mg                Code Status: Not on file  Lee: No urinary catheter in place  Lee Duration (in days):   Central line:    CLAU: 7/31/2024    Discharge is dependent on: progress  At this point Ms. Woodward is expected  to be discharge to: home    The 21st Century Cures Act makes medical notes like these available to patients in the interest of transparency. Please be advised this is a medical document. Medical documents are intended to carry relevant information, facts as evident, and the clinical opinion of the practitioner. The medical note is intended as peer to peer communication and may appear blunt or direct. It is written in medical language and may contain abbreviations or verbiage that are unfamiliar.

## 2024-07-31 NOTE — PROGRESS NOTES
[facility]  Urology Progress Note    Thais Woodward Patient Status:  Inpatient    1970 MRN [id number]   Location [facility] 3NW-A Attending Elizabeth Parmar MD   Hosp Day # 3 PCP Garrison Charles MD     Subjective:  Thais Woodward is a(n) 54 year old female.    S/P cystoscopy, right RGPG, right ureteral stent placement, ureteroscopy right 24 with Dr. Garner.      Current complaints: Still abdominal discomfort/bloating.  Some nausea yesterday. No vomiting, fever. Chills overnight.  +dysuria. Unsure about hematuria due to pyridium.  +irritative voiding symptoms.  Some right flank discomfort.  No more diarrhea.      Objective:  General appearance: alert, appears stated age, and cooperative  Blood pressure 101/58, pulse 75, temperature 98.1 °F (36.7 °C), temperature source Oral, resp. rate 18, height 5' 2\" (1.575 m), weight 147 lb (66.7 kg), SpO2 93%.  Lungs: non-labored respirations  Abdomen:soft, non-tender  Lab Results   Component Value Date    WBC 11.6 2024    HGB 10.3 2024    HCT 30.8 2024    .0 2024    CREATSERUM 0.77 2024    BUN 18 2024     2024    K 3.2 2024    K 3.2 2024     2024    CO2 27.0 2024     2024    CA 9.2 2024    ALB 3.6 2024    ALKPHO 120 2024    BILT 0.3 2024    TP 6.2 2024    AST 38 2024     2024       Hospital Encounter on 24   1. Blood Culture     Status: Abnormal (Preliminary result)    Collection Time: 24  1:21 PM    Specimen: Blood,peripheral   Result Value Ref Range    Blood Culture Result Positive N/A    Blood Culture Result Escherichia coli (A) N/A    Blood Smear Positive Blood Culture (A) N/A    Blood Smear Gram Negative Rods (A) N/A   2. Urine Culture, Routine     Status: Abnormal    Collection Time: 24 12:37 PM    Specimen: Urine, clean catch   Result Value Ref Range    Urine Culture >100,000 CFU/ML  Escherichia coli (A) N/A       Susceptibility    Escherichia coli -  (no method available)     Ampicillin <=2 Sensitive      Cefazolin <=4 Sensitive      Ciprofloxacin <=0.25 Sensitive      Gentamicin <=1 Sensitive      Meropenem <=0.25 Sensitive      Levofloxacin <=0.12 Sensitive      Nitrofurantoin <=16 Sensitive      Piperacillin + Tazobactam <=4 Sensitive      Trimethoprim/Sulfa <=20 Sensitive         CT ABDOMEN+PELVIS KIDNEYSTONE 2D RNDR(NO IV,NO ORAL)(CPT=74176)    Result Date: 7/29/2024  CONCLUSION:  A right ureteral stent has been placed since prior CT from 7/26/2024.  There is mild to moderate perinephric stranding and trace fluid about the right kidney.  Bilateral nephrolithiasis.  Left kidney is negative for hydronephrosis.  There is new free fluid in the pelvis in the cul de sac and superior to the dome of the urinary bladder.  Circumferential wall thickening of the urinary bladder.  Recommend clinical correlation to exclude cystitis/pyelonephritis.  Fibroid uterus.    LOCATION:  [id number]   Dictated by (CST): Vanessa Castillo MD on 7/29/2024 at 9:52 AM     Finalized by (CST): Vanessa Castillo MD on 7/29/2024 at 9:58 AM         Assessment:    FEVER, UTI/PYELO, RIGHT URETERAL CALCULUS, BILATERAL UROLITHIASIS  S/P cystoscopy, right RGPG, right ureteral stent placement, ureteroscopy right 7/28/24 with Dr. Garner.    Findings: The the UO was not inflamed or red consistent with a passed stone. We were able to easily cannulate the distal ureter and performed very gentle retrograde urogram noting some narrowness of the ureter approximately 5 cm to 6 cm above the ureteral orifice but no clear filling defect and and very mild hydronephrosis. Overt open-ended stent we placed a guidewire up into the kidney under fluoroscopy and then we looked with extort [place] ureteroscope up the right ureter we were able to look up about 5 to 6 cm but the ureter was very tight at this level and could not proceed any more proximally  secondary to the stenosis. Of note we performed ureteroscopy under gravity and did not use pressure. Because of the ureteral stenosis and concern from her previous fevers we decided to abort and place a 6 x 24 double-J stent.   Tmax 102.9 on 7/28, afebrile 7/29-7/30, and so far today  WBC 11.7 > 11.6  Serum creat 0.83 > 0.8 > 0.77  Serum calcium level 9.3 > 9.1 > 9.2  Urine culture 7/26/24: >100K CFU/mL: E. Coli  Urine culture 7/28/24: >100K CFU/mL E. coli  2/2 Blood culture 7/28/24: prelim GNR  Blood culture PCR 7/28/24: E. Coli by PCR detected, CTX-M (ESBL gene) by PCR not detected  CDIFF collected  CT abdomen/pelvis without contrast 7/29/24: A right ureteral stent has been placed when compared to 7/26/2024 CT.  There is perinephric stranding with trace free fluid surrounding the right kidney.  No hydronephrosis.  There is a 4 mm calcification in the midpole of the right kidney. There is a 3 mm calcification midpole left kidney and 1 mm calcification mid to inferior pole left kidney.  Left kidney is negative for hydronephrosis.  No CT evidence for obstructing calculus.    Plan:    Ureteral stent related symptoms reviewed with patient.  Check final cultures  Abx per attending  Follow labs, temp  Recommend cystoscopy, right RGPG, right ureteroscopy with laser lithotripsy, and ureteral stent exchange in 2 weeks while on abx - surgery ticket has been sent.    Abdominal US has been ordered by attending    LANA Nicholson [facility]  Department of Urology  7/31/2024  5:27 AM

## 2024-07-31 NOTE — PAYOR COMM NOTE
--------------  CONTINUED STAY REVIEW----REQUESTING ADDITIONAL DAYS 7/31      Payor: [employer] OUT OF STATE PPO  Subscriber #:  [id number]  Authorization Number: [id number]    Admit date: 7/28/24  Admit time:  2:29 PM    REVIEW DOCUMENTATION:  Chief Complaint: kidney stone, bacteremia     Subjective:      Does not feel well today, a lot of abdominal bloating and diarrhea,      Objective:    Review of Systems:   A comprehensive review of systems was completed; pertinent positive and negatives stated in subjective.     Vital signs:  Temp:  [98.6 °F (37 °C)-99.2 °F (37.3 °C)] 99.2 °F (37.3 °C)  Pulse:  [60-68] 66  Resp:  [17-18] 18  BP: (104-123)/(56-71) 111/70  SpO2:  [95 %-98 %] 98 %     Physical Exam:    General: No acute distress  Respiratory: No wheezes, no rhonchi  Cardiovascular: S1, S2, regular rate and rhythm  Abdomen: Soft, Non-tender, non-distended, positive bowel sounds  Neuro: No new focal deficits.   Extremities: No edema        Diagnostic Data:    Labs:        Recent Labs   Lab 07/28/24  1237 07/28/24  1321 07/30/24  0552   WBC 11.7*  11.7*  --  11.6*   HGB 12.2  12.2  --  10.3*   MCV 93.9  93.9  --  93.9   .0  226.0  --  239.0   INR  --  1.09  --                 Recent Labs   Lab 07/28/24  1237 07/29/24  0513 07/29/24  1355 07/30/24  0552   *  --  112* 106*   BUN 15  --  14 18   CREATSERUM 0.83  --  0.80 0.77   CA 9.3  --  9.1 9.2   ALB 4.7  --  4.0 3.6   *  --  138 139   K 3.6 3.7 3.5 3.2*  3.2*     --  106 107   CO2 28.0  --  28.0 27.0   ALKPHO 116*  --  139* 120*   AST 75*  --  67* 38*   *  --  141* 105*   BILT 0.5  --  0.4 0.3   TP 7.4  --  6.6 6.2         Estimated Creatinine Clearance: 66.1 mL/min (based on SCr of 0.77 mg/dL).     No results for input(s): \"TROP\", \"TROPHS\", \"CK\" in the last 168 hours.         Recent Labs   Lab 07/28/24  1321   PTP 14.1   INR 1.09            Microbiology             Hospital Encounter on 07/28/24   1. Blood Culture     Status:  Abnormal (Preliminary result)     Collection Time: 07/28/24  1:21 PM     Specimen: Blood,peripheral   Result Value Ref Range     Blood Culture Result Positive N/A     Blood Culture Result Escherichia coli (A) N/A     Blood Smear Positive Blood Culture (A) N/A     Blood Smear Gram Negative Rods (A) N/A   2. Urine Culture, Routine     Status: Abnormal     Collection Time: 07/28/24 12:37 PM     Specimen: Urine, clean catch   Result Value Ref Range     Urine Culture >100,000 CFU/ML Escherichia coli (A) N/A       Susceptibility     Escherichia coli -  (no method available)       Ampicillin <=2 Sensitive         Cefazolin <=4 Sensitive         Ciprofloxacin <=0.25 Sensitive         Gentamicin <=1 Sensitive         Meropenem <=0.25 Sensitive         Levofloxacin <=0.12 Sensitive         Nitrofurantoin <=16 Sensitive         Piperacillin + Tazobactam <=4 Sensitive         Trimethoprim/Sulfa <=20 Sensitive              Imaging: Reviewed in Epic.     Medications:    potassium chloride  40 mEq Oral Once    phenazopyridine  100 mg Oral TID CC    acetaminophen  1,000 mg Oral Once    potassium chloride  40 mEq Intravenous Once    piperacillin-tazobactam  3.375 g Intravenous Q8H    enoxaparin  40 mg Subcutaneous Daily         Assessment & Plan:       #Right ureteral stone  -s/p cysto and stent  -having left sided flank pain today,  CT reviewed and negative for acute changes     #UTI  #E.coli bacteremia  #Sepsis- due to above ( fever, hypotension, UTI)  -IVF  -follow final Cx results  -continue Abx  -afebrile     #Elevated LFTs  -could be related to sepsis   -stable  -CT showing liver cysts, no biliary dilatation  -RUQ US     Elizabeth Parmar MD        MEDICATIONS ADMINISTERED IN LAST 1 DAY:  acetaminophen (Tylenol Extra Strength) tab 500 mg       Date Action Dose Route User    7/30/2024 2029 Given 500 mg Oral Maritza De Jesus RN          ciprofloxacin in dextrose 5% (Cipro) 400 mg/200mL IVPB premix 400 mg       Date Action Dose  Route User    7/31/2024 0959 New Bag 400 mg Intravenous Antoinette Cardozo RN          enoxaparin (Lovenox) 40 MG/0.4ML SUBQ injection 40 mg       Date Action Dose Route User    7/30/2024 2029 Given 40 mg Subcutaneous (Right Lower Abdomen) Maritza De Jesus RN          piperacillin-tazobactam (Zosyn) 3.375 g in dextrose 5% 100 mL IVPB-ADDV       Date Action Dose Route User    7/31/2024 0047 New Bag 3.375 g Intravenous Maritza De Jesus RN    7/30/2024 1719 New Bag 3.375 g Intravenous Yennifer Iverson RN          potassium chloride (Klor-Con M20) tab 40 mEq       Date Action Dose Route User    7/31/2024 1131 Given 40 mEq Oral Antoinette Cardozo RN            Vitals (last day)       Date/Time Temp Pulse Resp BP SpO2 Weight O2 Device O2 Flow Rate (L/min) Who    07/31/24 0526 99.2 °F (37.3 °C) 63 18 126/70 94 % -- None (Room air) 0 L/min DI    07/31/24 0142 98.1 °F (36.7 °C) -- -- -- -- -- -- -- DI    07/30/24 2218 98.9 °F (37.2 °C) 75 18 101/58 93 % -- None (Room air) 0 L/min DI    07/30/24 1255 99.2 °F (37.3 °C) 66 18 111/70 98 % -- None (Room air) -- NW    07/30/24 0547 98.8 °F (37.1 °C) 60 18 123/71 95 % -- None (Room air) 0 L/min HB          CIWA Scores (since admission)       None

## 2024-08-01 NOTE — PAYOR COMM NOTE
--------------  DISCHARGE REVIEW    Payor: [facility] OUT OF STATE PPO  Subscriber #:  [id number]  Authorization Number: [id number]    Admit date: 24  Admit time:   2:29 PM  Discharge Date: 2024  5:55 PM     Admitting Physician: Orion Wing MD  Attending Physician:  No att. providers found  Primary Care Physician: Garrison Charles MD          Discharge Summary Notes        Discharge Summary signed by Elizabeth Parmar MD at 2024  8:07 AM       Author: Elizabeth Parmar MD Specialty: HOSPITALIST, Internal Medicine Author Type: Physician    Filed: 2024  8:07 AM Date of Service: 2024  7:53 AM Status: Signed    : Elizabeth Parmar MD (Physician)           [facility]  DISCHARGE SUMMARY     Thais Woodward Patient Status:  Inpatient    1970 MRN [id number]   Location [facility] 3N-A Attending No att. providers found   Hosp Day # 3 PCP Garrison Charles MD     Date of Admission: 2024  Date of Discharge:  2024     Discharge Disposition: Home or Self Care    Discharge Diagnosis:  Right ureteral stone  UTI  E.coli bacteremia  Sepsis    History of Present Illness:   Thais Woodward is a 54 year old female with history of nephrolithiasis who presents to emergency room to be evaluated for fever chills.  She initially presented to emergency room on  and she was diagnosed with distal right ureteral stone 3 mm in diameter.  Patient follows up with urology as an outpatient.  She states that she spiked fever 103 yesterday and today and she started to have significant body pains and bilateral flanks pain 5 6 out of 10 no radiation and cramping in nature.  She called her urologist and she was advised to come to emergency room to be evaluated.  Her CBC shows mildly elevated WBC with left shift.  AST and ALT are mildly elevated and sodium mildly decreased at 135.  UA shows 2+ nitrates and greater than 50 WBCs.  Urine culture from  shows E. coli pansensitive.     Brief  Synopsis:   Patient is a 54-year-old female admitted with E. coli sepsis and bacteremia secondary to right ureteral stone.  She underwent a cystoscopy and stent placement.  Her sepsis resolved.  She was continued on antibiotics.  Her culture grew E. coli and she was transition to oral antibiotics at the time of discharge.  She was also noted to have elevated LFTs which was likely related to sepsis.  Her CT showed liver cyst with no biliary dilatation and a right upper quadrant ultrasound was negative for acute disease.  She was discharged home in good condition    Lace+ Score: 32  59-90 High Risk  29-58 Medium Risk  0-28   Low Risk       TCM Follow-Up Recommendation:  LACE 29-58: Moderate Risk of readmission after discharge from the hospital.      Procedures during hospitalization:   CYSTOSCOPY, RIGHT RETROGRADE PYELOGRAM, RIGHT URETERAL STENT PLACEMENT,  URETEROSCOPY RIGHT     Consultants:  Urology    Discharge Medication List:     Discharge Medications        START taking these medications        Instructions Prescription details   ciprofloxacin 500 MG Tabs  Commonly known as: Cipro      Take 1 tablet (500 mg total) by mouth 2 (two) times daily for 7 days.   Stop taking on: August 7, 2024  Quantity: 14 tablet  Refills: 0     oxybutynin 5 MG Tabs  Commonly known as: Ditropan      Take 1 tablet (5 mg total) by mouth 4 (four) times daily as needed.   Quantity: 30 tablet  Refills: 0            CONTINUE taking these medications        Instructions Prescription details   cetirizine 10 MG Tabs  Commonly known as: ZyrTEC      Take 1 tablet (10 mg total) by mouth daily as needed for Allergies.   Refills: 0     omeprazole 20 MG Cpdr  Commonly known as: PriLOSEC      Take 1 capsule (20 mg total) by mouth every morning before breakfast.   Refills: 0     valACYclovir 1 G Tabs  Commonly known as: Valtrex      Take 1 tablet (1,000 mg total) by mouth daily. Unsure of dose   Refills: 0            STOP taking these medications       dicyclomine 20 MG Tabs  Commonly known as: Bentyl        fexofenadine 60 MG Tabs  Commonly known as: Allegra        MELOXICAM OR        ondansetron 4 MG Tbdp  Commonly known as: Zofran-ODT        pantoprazole 40 MG Tbec  Commonly known as: Protonix        sucralfate 1 g Tabs  Commonly known as: Carafate                  Where to Get Your Medications        These medications were sent to [facility], [address] [phone], [phone]  [address] [facility], [facility] [address], [facility] [address]      Phone: [phone]   ciprofloxacin 500 MG Tabs  oxybutynin 5 MG Tabs         [place]: n[place]    Follow-up appointment:   Giuseppe He MD  [address]  [address]  [facility] [id number] [phone]    Schedule an appointment as soon as possible for a visit  stone procedure in 2 weeks while on antibiotics.  Surgery scheduler will contact you to arrange procedure    Appointments for Next 30 Days 2024 - 2024      None            Vital signs:  Temp:  [98.8 °F (37.1 °C)] 98.8 °F (37.1 °C)  Pulse:  [74] 74  Resp:  [18] 18  BP: (141)/(72) 141/72    Physical Exam:    General: No acute distress   Lungs: clear to auscultation  Cardiovascular: S1, S2  Abdomen: Soft      -----------------------------------------------------------------------------------------------  PATIENT DISCHARGE INSTRUCTIONS: See electronic chart    Elizabeth Parmar MD    Total time spent on discharge plannin minutes     The  Cures Act makes medical notes like these available to patients in the interest of transparency. Please be advised this is a medical document. Medical documents are intended to carry relevant information, facts as evident, and the clinical opinion of the practitioner. The medical note is intended as peer to peer communication and may appear blunt or direct. It is written in medical language and may contain abbreviations or verbiage that are unfamiliar.       Electronically  signed by Elizabeth Parmar MD on 8/1/2024  8:07 AM         REVIEWER COMMENTS

## 2024-08-01 NOTE — PROGRESS NOTES
NURSING DISCHARGE NOTE    Discharged Home via Ambulatory.  Accompanied by Spouse  Belongings Taken by patient/family.    Patient given discharge instructions. Instructed to  prescription medications from listed pharmacy. Instructed to follow-up with urology at listed. Patient verbalized understanding. IV removed and intact. All questions and concerns addressed at this time.

## 2024-08-01 NOTE — DISCHARGE SUMMARY
[facility]     Thais Woodward Patient Status:  Inpatient    1970 MRN [id number]   Location [facility] 3[facility]-A Attending No att. providers found   Hosp Day # 3 PCP Garrison Charles MD     Date of Admission: 2024  Date of Discharge:  2024     Discharge Disposition: Home or Self Care    Discharge Diagnosis:  Right ureteral stone  UTI  E.coli bacteremia  Sepsis    History of Present Illness:   Thais Woodward is a 54 year old female with history of nephrolithiasis who presents to emergency room to be evaluated for fever chills.  She initially presented to emergency room on  and she was diagnosed with distal right ureteral stone 3 mm in diameter.  Patient follows up with urology as an outpatient.  She states that she spiked fever 103 yesterday and today and she started to have significant body pains and bilateral flanks pain 5 6 out of 10 no radiation and cramping in nature.  She called her urologist and she was advised to come to emergency room to be evaluated.  Her CBC shows mildly elevated WBC with left shift.  AST and ALT are mildly elevated and sodium mildly decreased at 135.  UA shows 2+ nitrates and greater than 50 WBCs.  Urine culture from  shows E. coli pansensitive.     Brief Synopsis:   Patient is a 54-year-old female admitted with E. coli sepsis and bacteremia secondary to right ureteral stone.  She underwent a cystoscopy and stent placement.  Her sepsis resolved.  She was continued on antibiotics.  Her culture grew E. coli and she was transition to oral antibiotics at the time of discharge.  She was also noted to have elevated LFTs which was likely related to sepsis.  Her CT showed liver cyst with no biliary dilatation and a right upper quadrant ultrasound was negative for acute disease.  She was discharged home in good condition    Lace+ Score: 32  59-90 High Risk  29-58 Medium Risk  0-28   Low Risk       TCM Follow-Up Recommendation:  LACE  29-58: Moderate Risk of readmission after discharge from the hospital.      Procedures during hospitalization:   CYSTOSCOPY, RIGHT RETROGRADE PYELOGRAM, RIGHT URETERAL STENT PLACEMENT,  URETEROSCOPY RIGHT     Consultants:  Urology    Discharge Medication List:     Discharge Medications        START taking these medications        Instructions Prescription details   ciprofloxacin 500 MG Tabs  Commonly known as: Cipro      Take 1 tablet (500 mg total) by mouth 2 (two) times daily for 7 days.   Stop taking on: August 7, 2024  Quantity: 14 tablet  Refills: 0     oxybutynin 5 MG Tabs  Commonly known as: Ditropan      Take 1 tablet (5 mg total) by mouth 4 (four) times daily as needed.   Quantity: 30 tablet  Refills: 0            CONTINUE taking these medications        Instructions Prescription details   cetirizine 10 MG Tabs  Commonly known as: ZyrTEC      Take 1 tablet (10 mg total) by mouth daily as needed for Allergies.   Refills: 0     omeprazole 20 MG Cpdr  Commonly known as: PriLOSEC      Take 1 capsule (20 mg total) by mouth every morning before breakfast.   Refills: 0     valACYclovir 1 G Tabs  Commonly known as: Valtrex      Take 1 tablet (1,000 mg total) by mouth daily. Unsure of dose   Refills: 0            STOP taking these medications      dicyclomine 20 MG Tabs  Commonly known as: Bentyl        fexofenadine 60 MG Tabs  Commonly known as: Allegra        MELOXICAM OR        ondansetron 4 MG Tbdp  Commonly known as: Zofran-ODT        pantoprazole 40 MG Tbec  Commonly known as: Protonix        sucralfate 1 g Tabs  Commonly known as: Carafate                  Where to Get Your Medications        These medications were sent to [address], [facility] [address] [phone], [phone]  [address] [place] [address]      Phone: [phone]   ciprofloxacin 500 MG Tabs  oxybutynin 5 MG Tabs         [facility] n[facility]a    Follow-up appointment:   Giuseppe He  MD  [address] [facility]  [address]  [facility] [address]  [phone]    Schedule an appointment as soon as possible for a visit  stone procedure in 2 weeks while on antibiotics.  Surgery scheduler will contact you to arrange procedure    Appointments for Next 30 Days 2024 - 2024      None            Vital signs:  Temp:  [98.8 °F (37.1 °C)] 98.8 °F (37.1 °C)  Pulse:  [74] 74  Resp:  [18] 18  BP: (141)/(72) 141/72    Physical Exam:    General: No acute distress   Lungs: clear to auscultation  Cardiovascular: S1, S2  Abdomen: Soft      -----------------------------------------------------------------------------------------------  PATIENT DISCHARGE INSTRUCTIONS: See electronic chart    Elizabeth Parmar MD    Total time spent on discharge plannin minutes     The  [employer] Cures Act makes medical notes like these available to patients in the interest of transparency. Please be advised this is a medical document. Medical documents are intended to carry relevant information, facts as evident, and the clinical opinion of the practitioner. The medical note is intended as peer to peer communication and may appear blunt or direct. It is written in medical language and may contain abbreviations or verbiage that are unfamiliar.

## 2024-08-02 LAB
BACTERIA BLD CULT: POSITIVE
BACTERIA BLD CULT: POSITIVE

## 2024-08-06 ENCOUNTER — HOSPITAL ENCOUNTER (EMERGENCY)
Age: 54
Discharge: HOME | End: 2024-08-06
Attending: EMERGENCY MEDICINE
Payer: COMMERCIAL

## 2024-08-06 VITALS
SYSTOLIC BLOOD PRESSURE: 133 MMHG | RESPIRATION RATE: 18 BRPM | DIASTOLIC BLOOD PRESSURE: 78 MMHG | OXYGEN SATURATION: 98 % | WEIGHT: 143 LBS | BODY MASS INDEX: 26.31 KG/M2 | HEART RATE: 72 BPM | TEMPERATURE: 98 F | HEIGHT: 62 IN

## 2024-08-06 DIAGNOSIS — Z00.00 WELL ADULT HEALTH CHECK: Primary | ICD-10-CM

## 2024-08-06 NOTE — ED PROVIDER NOTES
Patient Seen in: [facility] [place]      History     Chief Complaint   Patient presents with   • Other     Stated Complaint: wants preop testing done    Subjective:   HPI    54-year-old who has a ureteral stent in place presents to the emergency department because she is requiring or requesting preop testing before she can have her stent removal.  She does have a prescription to have this done as an outpatient.  She states that she was directed to the wrong desk in the front of the emergency department and ended up in the ER rather than the outpatient lab not understanding why she was needing to see an ER doctor to get this done.    Objective:   History reviewed. No pertinent past medical history.           Past Surgical History:   Procedure Laterality Date   • Enlarge breast with implant     • Jaw surgery     • Removal gallbladder     • Tonsillectomy                  Social History     Socioeconomic History   • Marital status:    Tobacco Use   • Smoking status: Never     Passive exposure: Never   • Smokeless tobacco: Never   Vaping Use   • Vaping status: Never Used   Substance and Sexual Activity   • Alcohol use: Not Currently   • Drug use: Not Currently     Social Determinants of Health     Food Insecurity: No Food Insecurity (7/28/2024)    Food Insecurity    • Food Insecurity: Never true   Transportation Needs: No Transportation Needs (7/28/2024)    Transportation Needs    • Lack of Transportation: No   Housing Stability: Low Risk  (7/28/2024)    Housing Stability    • Housing Instability: No              Review of Systems    Positive for stated Chief Complaint: Other    Other systems are as noted in HPI.  Constitutional and vital signs reviewed.      All other systems reviewed and negative except as noted above.    Physical Exam     ED Triage Vitals [08/06/24 1657]   /78   Pulse 72   Resp 18   Temp 98.4 °F (36.9 °C)   Temp src Oral   SpO2 98 %   O2 Device        Current  Vitals:   Vital Signs  BP: 133/78  Pulse: 72  Resp: 18  Temp: 98.4 °F (36.9 °C)  Temp src: Oral    Oxygen Therapy  SpO2: 98 %            Physical Exam    ***      ED Course   Labs Reviewed - No data to display          ***         MDM      ***                                   Medical Decision Making      Disposition and Plan     Clinical Impression:  1. Well adult health check         Disposition:  Discharge  8/6/2024  7:24 pm    Follow-up:  No follow-up provider specified.        Medications Prescribed:  Current Discharge Medication List

## 2024-08-06 NOTE — ED INITIAL ASSESSMENT (HPI)
Patient here for preop testing for procedure on Monday. Patient complains of pain related to stent which is to be removed on Monday.

## 2024-08-12 ENCOUNTER — LAB REQUISITION (OUTPATIENT)
Dept: LAB | Facility: HOSPITAL | Age: 54
End: 2024-08-12
Payer: COMMERCIAL

## 2024-08-12 DIAGNOSIS — N20.0 CALCULUS OF KIDNEY: ICD-10-CM

## 2024-08-12 DIAGNOSIS — N20.1 CALCULUS OF URETER: ICD-10-CM

## 2024-08-12 PROCEDURE — 82365 CALCULUS SPECTROSCOPY: CPT | Performed by: UROLOGY

## 2024-08-22 LAB
CAOX MONOHYDRATE: 100 %
WEIGHT-STONE: 13 MG

## (undated) DEVICE — SOLUTION IRRIG 3000ML 0.9% NACL FLX CONT

## (undated) DEVICE — HYDROGEL COATED URETERAL DILATOR: Brand: NOTTINGHAM ONE-STEP

## (undated) DEVICE — PACK PBDS CYSTOSCOPY

## (undated) DEVICE — SLEEVE COMPR MD KNEE LEN SGL USE KENDALL SCD

## (undated) DEVICE — ADHESIVE LIQ 2/3ML VI MASTISOL

## (undated) DEVICE — 20 ML SYRINGE LUER-LOCK TIP: Brand: MONOJECT

## (undated) DEVICE — SYRINGE MED 10ML LL TIP W/O SFTY DISP

## (undated) DEVICE — SYRINGE MED 20ML STD CLR PLAS LL TIP N CTRL

## (undated) DEVICE — NITINOL WIRE WITH HYDROPHILIC TIP: Brand: SENSOR

## (undated) DEVICE — GLOVE SUR 8 SENSICARE NEOPR PWD F

## (undated) DEVICE — SKN PREP SPNG STKS PVP PNT STR: Brand: MEDLINE INDUSTRIES, INC.

## (undated) DEVICE — GLOVE SUR 7.5 SENSICARE PI PIP CRM PWD F

## (undated) NOTE — LETTER
[address]  [address]  Authorization for Surgical Operation and Procedure     Date:___________                                                                                                         Time:__________  I hereby authorize Surgeon(s):  Louie Garner MD, my physician and his/her assistants (if applicable), which may include medical students, residents, and/or fellows, to perform the following surgical operation/ procedure and administer such anesthesia as may be determined necessary by my physician:  Operation/Procedure name (s) Procedure(s):  CYSTOSCOPY, RIGHT RETROGRADE PYELOGRAM, RIGHT URETERAL STENT PLACEMENT, POSSIBLE  URETEROSCOPY on Thais Woodward   2.   I recognize that during the surgical operation/procedure, unforeseen conditions may necessitate additional or different procedures than those listed above.  I, therefore, further authorize and request that the above-named surgeon, assistants, or designees perform such procedures as are, in their judgment, necessary and desirable.    3.   My surgeon/physician has discussed prior to my surgery the potential benefits, risks and side effects of this procedure; the likelihood of achieving goals; and potential problems that might occur during recuperation.  They also discussed reasonable alternatives to the procedure, including risks, benefits, and side effects related to the alternatives and risks related to not receiving this procedure.  I have had all my questions answered and I acknowledge that no guarantee has been made as to the result that may be obtained.    4.   Should the need arise during my operation/procedure, which includes change of level of care prior to discharge, I also consent to the administration of blood and/or blood products.  Further, I understand that despite careful testing and screening of blood or blood products by collecting agencies, I may still be subject to ill effects  as a result of receiving a blood transfusion and/or blood products.  The following are some, but not all, of the potential risks that can occur: fever and allergic reactions, hemolytic reactions, transmission of diseases such as Hepatitis, AIDS and Cytomegalovirus (CMV) and fluid overload.  In the event that I wish to have an autologous transfusion of my own blood, or a directed donor transfusion, I will discuss this with my physician.  Check only if Refusing Blood or Blood Products  I understand refusal of blood or blood products as deemed necessary by my physician may have serious consequences to my condition to include possible death. I hereby assume responsibility for my refusal and release the hospital, its personnel, and my physicians from any responsibility for the consequences of my refusal.          o  Refuse      5.   I authorize the use of any specimen, organs, tissues, body parts or foreign objects that may be removed from my body during the operation/procedure for diagnosis, research or teaching purposes and their subsequent disposal by hospital authorities.  I also authorize the release of specimen test results and/or written reports to my treating physician on the hospital medical staff or other referring or consulting physicians involved in my care, at the discretion of the Pathologist or my treating physician.    6.   I consent to the photographing or videotaping of the operations or procedures to be performed, including appropriate portions of my body for medical, scientific, or educational purposes, provided my identity is not revealed by the pictures or by descriptive texts accompanying them.  If the procedure has been photographed/videotaped, the surgeon will obtain the original picture, image, videotape or CD.  The hospital will not be responsible for storage, release or maintenance of the picture, image, tape or CD.    7.   I consent to the presence of a  or observers in the  operating room as deemed necessary by my physician or their designees.    8.   I recognize that in the event my procedure results in extended X-Ray/fluoroscopy time, I may develop a skin reaction.    9. If I have a Do Not Attempt Resuscitation (DNAR) order in place, that status will be suspended while in the operating room, procedural suite, and during the recovery period unless otherwise explicitly stated by me (or a person authorized to consent on my behalf). The surgeon or my attending physician will determine when the applicable recovery period ends for purposes of reinstating the DNAR order.  10. Patients having a sterilization procedure: I understand that if the procedure is successful the results will be permanent and it will therefore be impossible for me to inseminate, conceive, or bear children.  I also understand that the procedure is intended to result in sterility, although the result has not been guaranteed.   11. I acknowledge that my physician has explained sedation/analgesia administration to me including the risk and benefits I consent to the administration of sedation/analgesia as may be necessary or desirable in the judgment of my physician.    I CERTIFY THAT I HAVE READ AND FULLY UNDERSTAND THE ABOVE CONSENT TO OPERATION and/or OTHER PROCEDURE.    _________________________________________  __________________________________  Signature of Patient     Signature of Responsible Person         ___________________________________         Printed Name of Responsible Person           _________________________________                 Relationship to Patient  _________________________________________  ______________________________  Signature of Witness          Date  Time      Patient Name: Thais Woodward     : 1970                 Printed: 2024     Medical Record #: [id number]                     Page 1 of 2                                    [address]  [place]  [address]    Consent for Anesthesia    I, Thais Woodward agree to be cared for by an anesthesiologist, who is specially trained to monitor me and give me medicine to put me to sleep or keep me comfortable during my procedure    I understand that my anesthesiologist is not an employee or agent of [facility] or [employer]. He or she works for [employer].    As the patient asking for anesthesia services, I agree to:  Allow the anesthesiologist (anesthesia doctor) to give me medicine and do additional procedures as necessary. Some examples are: Starting or using an “IV” to give me medicine, fluids or blood during my procedure, and having a breathing tube placed to help me breathe when I’m asleep (intubation). In the event that my heart stops working properly, I understand that my anesthesiologist will make every effort to sustain my life, unless otherwise directed by [facility] Do Not Resuscitate documents.  Tell my anesthesia doctor before my procedure:  If I am pregnant.  The last time that I ate or drank.  All of the medicines I take (including prescriptions, herbal supplements, and pills I can buy without a prescription (including street drugs/illegal medications). Failure to inform my anesthesiologist about these medicines may increase my risk of anesthetic complications.  If I am allergic to anything or have had a reaction to anesthesia before.  I understand how the anesthesia medicine will help me (benefits).  I understand that with any type of anesthesia medicine there are risks:  The most common risks are: nausea, vomiting, sore throat, muscle soreness, damage to my eyes, mouth, or teeth (from breathing tube placement).  Rare risks include: remembering what happened during my procedure, allergic reactions to medications, injury to my airway, heart, lungs, vision, nerves, or muscles and in extremely rare instances death.  My doctor has explained to me  other choices available to me for my care (alternatives).  Pregnant Patients (“epidural”):  I understand that the risks of having an epidural (medicine given into my back to help control pain during labor), include itching, low blood pressure, difficulty urinating, headache or slowing of the baby’s heart. Very rare risks include infection, bleeding, seizure, irregular heart rhythms and nerve injury.  Regional Anesthesia (“spinal”, “epidural”, & “nerve blocks”):  I understand that rare but potential complications include headache, bleeding, infection, seizure, irregular heart rhythms, and nerve injury.    I can change my mind about having anesthesia services at any time before I get the medicine.    _____________________________________________________________________________  Patient (or Representative) Signature/Relationship to Patient  Date   Time    _____________________________________________________________________________   Name (if used)    Language/Organization   Time    _____________________________________________________________________________  Anesthesiologist Signature     Date   Time  I have discussed the procedure and information above with the patient (or patient’s representative) and answered their questions. The patient or their representative has agreed to have anesthesia services.    _____________________________________________________________________________  Witness        Date   Time  I have verified that the signature is that of the patient or patient’s representative, and that it was signed before the procedure  Patient Name: Thais Woodward     : 1970                 Printed: 2024     Medical Record #: [id number]                     Page 2 of 2